# Patient Record
Sex: FEMALE | Race: BLACK OR AFRICAN AMERICAN | NOT HISPANIC OR LATINO
[De-identification: names, ages, dates, MRNs, and addresses within clinical notes are randomized per-mention and may not be internally consistent; named-entity substitution may affect disease eponyms.]

---

## 2017-01-20 ENCOUNTER — APPOINTMENT (OUTPATIENT)
Dept: PULMONOLOGY | Facility: CLINIC | Age: 62
End: 2017-01-20

## 2017-02-06 ENCOUNTER — APPOINTMENT (OUTPATIENT)
Dept: CARDIOTHORACIC SURGERY | Facility: CLINIC | Age: 62
End: 2017-02-06

## 2017-02-06 VITALS
HEIGHT: 59 IN | OXYGEN SATURATION: 97 % | WEIGHT: 138 LBS | BODY MASS INDEX: 27.82 KG/M2 | RESPIRATION RATE: 18 BRPM | HEART RATE: 82 BPM | DIASTOLIC BLOOD PRESSURE: 83 MMHG | SYSTOLIC BLOOD PRESSURE: 134 MMHG

## 2017-02-13 ENCOUNTER — RECORD ABSTRACTING (OUTPATIENT)
Age: 62
End: 2017-02-13

## 2017-02-13 DIAGNOSIS — Z86.010 PERSONAL HISTORY OF COLONIC POLYPS: ICD-10-CM

## 2017-03-21 ENCOUNTER — APPOINTMENT (OUTPATIENT)
Dept: GASTROENTEROLOGY | Facility: GI CENTER | Age: 62
End: 2017-03-21

## 2017-05-01 ENCOUNTER — APPOINTMENT (OUTPATIENT)
Dept: GASTROENTEROLOGY | Facility: GI CENTER | Age: 62
End: 2017-05-01

## 2017-06-30 ENCOUNTER — APPOINTMENT (OUTPATIENT)
Dept: PULMONOLOGY | Facility: CLINIC | Age: 62
End: 2017-06-30

## 2017-06-30 VITALS — SYSTOLIC BLOOD PRESSURE: 124 MMHG | DIASTOLIC BLOOD PRESSURE: 80 MMHG | BODY MASS INDEX: 31.51 KG/M2 | WEIGHT: 156 LBS

## 2017-06-30 VITALS — WEIGHT: 155 LBS | BODY MASS INDEX: 31.31 KG/M2

## 2017-06-30 VITALS — OXYGEN SATURATION: 96 %

## 2017-06-30 RX ORDER — WARFARIN 1 MG/1
1 TABLET ORAL
Qty: 30 | Refills: 0 | Status: ACTIVE | COMMUNITY
Start: 2017-05-27

## 2017-06-30 RX ORDER — METOPROLOL SUCCINATE 100 MG/1
100 TABLET, EXTENDED RELEASE ORAL
Qty: 180 | Refills: 0 | Status: ACTIVE | COMMUNITY
Start: 2017-03-10

## 2017-06-30 RX ORDER — WARFARIN 2 MG/1
2 TABLET ORAL
Qty: 30 | Refills: 0 | Status: ACTIVE | COMMUNITY
Start: 2017-05-27

## 2017-06-30 RX ORDER — AMOXICILLIN 500 MG/1
500 CAPSULE ORAL
Qty: 20 | Refills: 0 | Status: DISCONTINUED | COMMUNITY
Start: 2017-06-08

## 2017-06-30 RX ORDER — FLUCONAZOLE 150 MG/1
150 TABLET ORAL
Qty: 4 | Refills: 0 | Status: DISCONTINUED | COMMUNITY
Start: 2017-06-08

## 2017-06-30 RX ORDER — ENOXAPARIN SODIUM 100 MG/ML
60 INJECTION SUBCUTANEOUS
Qty: 4 | Refills: 0 | Status: DISCONTINUED | COMMUNITY
Start: 2017-04-06

## 2017-06-30 RX ORDER — CHLORHEXIDINE GLUCONATE, 0.12% ORAL RINSE 1.2 MG/ML
0.12 SOLUTION DENTAL
Qty: 473 | Refills: 0 | Status: DISCONTINUED | COMMUNITY
Start: 2017-01-20

## 2017-10-02 ENCOUNTER — APPOINTMENT (OUTPATIENT)
Dept: PULMONOLOGY | Facility: CLINIC | Age: 62
End: 2017-10-02
Payer: MEDICARE

## 2017-10-02 VITALS — BODY MASS INDEX: 33.53 KG/M2 | WEIGHT: 166 LBS

## 2017-10-02 VITALS — SYSTOLIC BLOOD PRESSURE: 128 MMHG | DIASTOLIC BLOOD PRESSURE: 80 MMHG

## 2017-10-02 PROCEDURE — 94010 BREATHING CAPACITY TEST: CPT

## 2017-10-02 PROCEDURE — 99214 OFFICE O/P EST MOD 30 MIN: CPT | Mod: 25

## 2017-10-02 RX ORDER — UMECLIDINIUM 62.5 UG/1
62.5 AEROSOL, POWDER ORAL DAILY
Qty: 1 | Refills: 5 | Status: DISCONTINUED | COMMUNITY
Start: 2017-06-30 | End: 2017-10-02

## 2018-01-08 ENCOUNTER — APPOINTMENT (OUTPATIENT)
Dept: PULMONOLOGY | Facility: CLINIC | Age: 63
End: 2018-01-08
Payer: MEDICARE

## 2018-01-08 VITALS — BODY MASS INDEX: 34.94 KG/M2 | WEIGHT: 173 LBS

## 2018-01-08 VITALS — DIASTOLIC BLOOD PRESSURE: 82 MMHG | SYSTOLIC BLOOD PRESSURE: 120 MMHG

## 2018-01-08 VITALS — OXYGEN SATURATION: 95 % | HEART RATE: 86 BPM

## 2018-01-08 PROCEDURE — 94010 BREATHING CAPACITY TEST: CPT

## 2018-01-08 PROCEDURE — 99214 OFFICE O/P EST MOD 30 MIN: CPT | Mod: 25

## 2018-05-10 ENCOUNTER — APPOINTMENT (OUTPATIENT)
Dept: PULMONOLOGY | Facility: CLINIC | Age: 63
End: 2018-05-10
Payer: MEDICARE

## 2018-05-10 VITALS
WEIGHT: 176 LBS | DIASTOLIC BLOOD PRESSURE: 78 MMHG | RESPIRATION RATE: 16 BRPM | HEART RATE: 73 BPM | OXYGEN SATURATION: 95 % | SYSTOLIC BLOOD PRESSURE: 126 MMHG | BODY MASS INDEX: 35.55 KG/M2

## 2018-05-10 DIAGNOSIS — R57.0 CARDIOGENIC SHOCK: ICD-10-CM

## 2018-05-10 PROCEDURE — 99214 OFFICE O/P EST MOD 30 MIN: CPT

## 2018-11-12 ENCOUNTER — APPOINTMENT (OUTPATIENT)
Dept: PULMONOLOGY | Facility: CLINIC | Age: 63
End: 2018-11-12
Payer: MEDICARE

## 2018-11-12 VITALS
HEART RATE: 81 BPM | OXYGEN SATURATION: 98 % | SYSTOLIC BLOOD PRESSURE: 130 MMHG | WEIGHT: 179 LBS | DIASTOLIC BLOOD PRESSURE: 80 MMHG | BODY MASS INDEX: 36.15 KG/M2

## 2018-11-12 PROCEDURE — 99214 OFFICE O/P EST MOD 30 MIN: CPT

## 2018-11-12 RX ORDER — POTASSIUM CHLORIDE 1500 MG/1
20 TABLET, EXTENDED RELEASE ORAL
Qty: 30 | Refills: 0 | Status: DISCONTINUED | COMMUNITY
Start: 2016-12-30 | End: 2018-11-12

## 2019-05-13 ENCOUNTER — APPOINTMENT (OUTPATIENT)
Dept: PULMONOLOGY | Facility: CLINIC | Age: 64
End: 2019-05-13

## 2019-05-15 ENCOUNTER — EMERGENCY (EMERGENCY)
Facility: HOSPITAL | Age: 64
LOS: 1 days | Discharge: HOME | End: 2019-05-15
Attending: STUDENT IN AN ORGANIZED HEALTH CARE EDUCATION/TRAINING PROGRAM
Payer: MEDICARE

## 2019-05-15 VITALS
RESPIRATION RATE: 18 BRPM | HEIGHT: 60 IN | SYSTOLIC BLOOD PRESSURE: 142 MMHG | WEIGHT: 179.9 LBS | OXYGEN SATURATION: 94 % | TEMPERATURE: 98 F | DIASTOLIC BLOOD PRESSURE: 76 MMHG | HEART RATE: 92 BPM

## 2019-05-15 DIAGNOSIS — Z95.2 PRESENCE OF PROSTHETIC HEART VALVE: Chronic | ICD-10-CM

## 2019-05-15 DIAGNOSIS — Z78.9 OTHER SPECIFIED HEALTH STATUS: Chronic | ICD-10-CM

## 2019-05-15 LAB
ALBUMIN SERPL ELPH-MCNC: 4.4 G/DL — SIGNIFICANT CHANGE UP (ref 3.3–5.2)
ALP SERPL-CCNC: 87 U/L — SIGNIFICANT CHANGE UP (ref 40–120)
ALT FLD-CCNC: 18 U/L — SIGNIFICANT CHANGE UP
ANION GAP SERPL CALC-SCNC: 15 MMOL/L — SIGNIFICANT CHANGE UP (ref 5–17)
AST SERPL-CCNC: 21 U/L — SIGNIFICANT CHANGE UP
BASOPHILS # BLD AUTO: 0 K/UL — SIGNIFICANT CHANGE UP (ref 0–0.2)
BASOPHILS NFR BLD AUTO: 0.3 % — SIGNIFICANT CHANGE UP (ref 0–2)
BILIRUB SERPL-MCNC: 0.3 MG/DL — LOW (ref 0.4–2)
BUN SERPL-MCNC: 16 MG/DL — SIGNIFICANT CHANGE UP (ref 8–20)
CALCIUM SERPL-MCNC: 10.1 MG/DL — SIGNIFICANT CHANGE UP (ref 8.6–10.2)
CHLORIDE SERPL-SCNC: 100 MMOL/L — SIGNIFICANT CHANGE UP (ref 98–107)
CK SERPL-CCNC: 112 U/L — SIGNIFICANT CHANGE UP (ref 25–170)
CO2 SERPL-SCNC: 22 MMOL/L — SIGNIFICANT CHANGE UP (ref 22–29)
CREAT SERPL-MCNC: 0.77 MG/DL — SIGNIFICANT CHANGE UP (ref 0.5–1.3)
EOSINOPHIL # BLD AUTO: 0.1 K/UL — SIGNIFICANT CHANGE UP (ref 0–0.5)
EOSINOPHIL NFR BLD AUTO: 2.2 % — SIGNIFICANT CHANGE UP (ref 0–6)
GLUCOSE SERPL-MCNC: 78 MG/DL — SIGNIFICANT CHANGE UP (ref 70–115)
HCT VFR BLD CALC: 45 % — SIGNIFICANT CHANGE UP (ref 37–47)
HGB BLD-MCNC: 14.8 G/DL — SIGNIFICANT CHANGE UP (ref 12–16)
LYMPHOCYTES # BLD AUTO: 1.5 K/UL — SIGNIFICANT CHANGE UP (ref 1–4.8)
LYMPHOCYTES # BLD AUTO: 25.6 % — SIGNIFICANT CHANGE UP (ref 20–55)
MAGNESIUM SERPL-MCNC: 2.1 MG/DL — SIGNIFICANT CHANGE UP (ref 1.8–2.6)
MCHC RBC-ENTMCNC: 28.8 PG — SIGNIFICANT CHANGE UP (ref 27–31)
MCHC RBC-ENTMCNC: 32.9 G/DL — SIGNIFICANT CHANGE UP (ref 32–36)
MCV RBC AUTO: 87.5 FL — SIGNIFICANT CHANGE UP (ref 81–99)
MONOCYTES # BLD AUTO: 0.8 K/UL — SIGNIFICANT CHANGE UP (ref 0–0.8)
MONOCYTES NFR BLD AUTO: 14.5 % — HIGH (ref 3–10)
NEUTROPHILS # BLD AUTO: 3.3 K/UL — SIGNIFICANT CHANGE UP (ref 1.8–8)
NEUTROPHILS NFR BLD AUTO: 57.2 % — SIGNIFICANT CHANGE UP (ref 37–73)
PLATELET # BLD AUTO: 234 K/UL — SIGNIFICANT CHANGE UP (ref 150–400)
POTASSIUM SERPL-MCNC: 4.4 MMOL/L — SIGNIFICANT CHANGE UP (ref 3.5–5.3)
POTASSIUM SERPL-SCNC: 4.4 MMOL/L — SIGNIFICANT CHANGE UP (ref 3.5–5.3)
PROT SERPL-MCNC: 7.5 G/DL — SIGNIFICANT CHANGE UP (ref 6.6–8.7)
RBC # BLD: 5.14 M/UL — SIGNIFICANT CHANGE UP (ref 4.4–5.2)
RBC # FLD: 13.9 % — SIGNIFICANT CHANGE UP (ref 11–15.6)
SODIUM SERPL-SCNC: 137 MMOL/L — SIGNIFICANT CHANGE UP (ref 135–145)
WBC # BLD: 5.8 K/UL — SIGNIFICANT CHANGE UP (ref 4.8–10.8)
WBC # FLD AUTO: 5.8 K/UL — SIGNIFICANT CHANGE UP (ref 4.8–10.8)

## 2019-05-15 PROCEDURE — 82550 ASSAY OF CK (CPK): CPT

## 2019-05-15 PROCEDURE — 99284 EMERGENCY DEPT VISIT MOD MDM: CPT

## 2019-05-15 PROCEDURE — 80053 COMPREHEN METABOLIC PANEL: CPT

## 2019-05-15 PROCEDURE — 36415 COLL VENOUS BLD VENIPUNCTURE: CPT

## 2019-05-15 PROCEDURE — 83735 ASSAY OF MAGNESIUM: CPT

## 2019-05-15 PROCEDURE — 93970 EXTREMITY STUDY: CPT

## 2019-05-15 PROCEDURE — 85027 COMPLETE CBC AUTOMATED: CPT

## 2019-05-15 PROCEDURE — 93970 EXTREMITY STUDY: CPT | Mod: 26

## 2019-05-15 NOTE — ED STATDOCS - NS ED ROS FT
Const:No fever/chills  EENMT: No photophobia/eye pain/changes in vision, No ear pain/sore throat/dysphagia,   Cardiac: No chest pain/palpitations  Resp: no SOB/cough/wheeze/stridor  Abd: No abdominal pain, No N/V/D  : no dysuria/frequency/discharge  MSK: No neck/back pain, +BLE pain  Skin: no rash  Neuro: no changes in neurological status/function.

## 2019-05-15 NOTE — ED STATDOCS - PROGRESS NOTE DETAILS
PA Note: PT evaluated by intake physician. HPI/PE/ROS as noted above. Will follow up plan per intake physician. PA Note: labwork and ultrasound wnl. Pt PA Note: labwork and ultrasound wnl. Pt given copies of her results and instructed to follow up with her PCP. Pt acknowledged understanding.

## 2019-05-15 NOTE — ED ADULT NURSE NOTE - OBJECTIVE STATEMENT
assumed pt care at 1957.  Pt reports muscle cramps in both legs and feet today.  Took motrin at 1600. Currently has no pain.

## 2019-05-15 NOTE — ED STATDOCS - PHYSICAL EXAMINATION
Constitutional - well-developed; well nourished.   Head - NCAT. Airway patent.   Eyes - PERRL.   CV - RRR. no murmur. no edema.   Pulm - CTAB.   Abd - soft, nt. no rebound. no guarding.   Neuro - A&Ox3. strength 5/5 x4. sensation intact x4. normal gait.   Skin - No rash.   MSK - normal ROM.

## 2019-05-15 NOTE — ED STATDOCS - ATTENDING CONTRIBUTION TO CARE
I performed a face to face history and physical exam of the patient and discussed their management with the resident/ACP. I reviewed the resident/ACP's note and agree with the documented findings and plan of care.    Pt with b/L lower extremity cramping. labs and imaging reviewed. Pt reassured and instructed to f/up with pcp and to return for new/worsening symptoms.

## 2019-05-15 NOTE — ED STATDOCS - OBJECTIVE STATEMENT
Pt is a 65 y/o F presenting to the ED with c/o bilateral leg and feet pain, onset today. Pt states she woke up with a cramping in her feet that radiates up her legs. She states the pain has been constant since and worse with walking. Denies fever, swelling, numbness/tingling/weakness ot the BLE, CP, and SOB. no trauma. Pt states she walked around all day but it did not exacerbate or relieve her sxs. Took motrin prior to arrival with some improvement in sxs.

## 2019-05-15 NOTE — ED ADULT NURSE NOTE - CHIEF COMPLAINT QUOTE
Patient arrived ambulatory to ED, awake, alert, and oriented times 3, breathing unlabored.  Patient complaining of " cramping and spasms" to bilateral lower extremities.  No SOB.  Symptoms states symptoms started this morning.  Normal O2 sat between 92-94% as per patient.

## 2020-02-11 PROBLEM — Z78.9 OTHER SPECIFIED HEALTH STATUS: Chronic | Status: ACTIVE | Noted: 2019-05-15

## 2020-03-18 ENCOUNTER — APPOINTMENT (OUTPATIENT)
Dept: PULMONOLOGY | Facility: CLINIC | Age: 65
End: 2020-03-18
Payer: MEDICARE

## 2020-03-18 VITALS
BODY MASS INDEX: 36.08 KG/M2 | OXYGEN SATURATION: 96 % | DIASTOLIC BLOOD PRESSURE: 86 MMHG | WEIGHT: 179 LBS | HEIGHT: 59 IN | HEART RATE: 96 BPM | SYSTOLIC BLOOD PRESSURE: 120 MMHG

## 2020-03-18 PROCEDURE — 99214 OFFICE O/P EST MOD 30 MIN: CPT

## 2020-03-18 NOTE — PHYSICAL EXAM
[No Acute Distress] : no acute distress [Normal Oropharynx] : normal oropharynx [Normal Appearance] : normal appearance [No Neck Mass] : no neck mass [Normal Rate/Rhythm] : normal rate/rhythm [Normal S1, S2] : normal s1, s2 [No Murmurs] : no murmurs [No Resp Distress] : no resp distress [Clear to Auscultation Bilaterally] : clear to auscultation bilaterally [No Abnormalities] : no abnormalities [Benign] : benign [Normal Gait] : normal gait [No Clubbing] : no clubbing [No Cyanosis] : no cyanosis [No Edema] : no edema [FROM] : FROM [Normal Color/ Pigmentation] : normal color/ pigmentation [No Focal Deficits] : no focal deficits [Oriented x3] : oriented x3 [Normal Affect] : normal affect [TextBox_54] : Mechanical valve snap with systole

## 2020-03-18 NOTE — REVIEW OF SYSTEMS
[Negative] : Endocrine [Chest Discomfort] : no chest discomfort [Edema] : no edema [Orthopnea] : no orthopnea [PND] : no PND [TextBox_30] : Per HPI

## 2020-03-25 RX ORDER — TIOTROPIUM BROMIDE INHALATION SPRAY 3.12 UG/1
2.5 SPRAY, METERED RESPIRATORY (INHALATION) DAILY
Qty: 3 | Refills: 1 | Status: DISCONTINUED | COMMUNITY
Start: 2017-10-02 | End: 2020-03-25

## 2021-01-21 ENCOUNTER — EMERGENCY (EMERGENCY)
Facility: HOSPITAL | Age: 66
LOS: 1 days | Discharge: HOME | End: 2021-01-21
Attending: EMERGENCY MEDICINE
Payer: MEDICARE

## 2021-01-21 VITALS
WEIGHT: 184.09 LBS | OXYGEN SATURATION: 100 % | HEIGHT: 60 IN | HEART RATE: 77 BPM | SYSTOLIC BLOOD PRESSURE: 155 MMHG | RESPIRATION RATE: 19 BRPM | DIASTOLIC BLOOD PRESSURE: 81 MMHG | TEMPERATURE: 99 F

## 2021-01-21 DIAGNOSIS — Z95.2 PRESENCE OF PROSTHETIC HEART VALVE: Chronic | ICD-10-CM

## 2021-01-21 DIAGNOSIS — Z78.9 OTHER SPECIFIED HEALTH STATUS: Chronic | ICD-10-CM

## 2021-01-21 LAB
ALBUMIN SERPL ELPH-MCNC: 4.2 G/DL — SIGNIFICANT CHANGE UP (ref 3.3–5.2)
ALP SERPL-CCNC: 89 U/L — SIGNIFICANT CHANGE UP (ref 40–120)
ALT FLD-CCNC: 59 U/L — HIGH
ANION GAP SERPL CALC-SCNC: 10 MMOL/L — SIGNIFICANT CHANGE UP (ref 5–17)
APTT BLD: 41.1 SEC — HIGH (ref 27.5–35.5)
AST SERPL-CCNC: 94 U/L — HIGH
BASOPHILS # BLD AUTO: 0 K/UL — SIGNIFICANT CHANGE UP (ref 0–0.2)
BASOPHILS NFR BLD AUTO: 0 % — SIGNIFICANT CHANGE UP (ref 0–2)
BILIRUB SERPL-MCNC: 0.7 MG/DL — SIGNIFICANT CHANGE UP (ref 0.4–2)
BUN SERPL-MCNC: 16 MG/DL — SIGNIFICANT CHANGE UP (ref 8–20)
CALCIUM SERPL-MCNC: 9.8 MG/DL — SIGNIFICANT CHANGE UP (ref 8.6–10.2)
CHLORIDE SERPL-SCNC: 100 MMOL/L — SIGNIFICANT CHANGE UP (ref 98–107)
CO2 SERPL-SCNC: 25 MMOL/L — SIGNIFICANT CHANGE UP (ref 22–29)
CREAT SERPL-MCNC: 1.21 MG/DL — SIGNIFICANT CHANGE UP (ref 0.5–1.3)
EOSINOPHIL # BLD AUTO: 0 K/UL — SIGNIFICANT CHANGE UP (ref 0–0.5)
EOSINOPHIL NFR BLD AUTO: 0 % — SIGNIFICANT CHANGE UP (ref 0–6)
GIANT PLATELETS BLD QL SMEAR: PRESENT — SIGNIFICANT CHANGE UP
GLUCOSE SERPL-MCNC: 102 MG/DL — HIGH (ref 70–99)
HCT VFR BLD CALC: 45.4 % — HIGH (ref 34.5–45)
HGB BLD-MCNC: 14.8 G/DL — SIGNIFICANT CHANGE UP (ref 11.5–15.5)
INR BLD: 3.45 RATIO — HIGH (ref 0.88–1.16)
LIDOCAIN IGE QN: 20 U/L — LOW (ref 22–51)
LYMPHOCYTES # BLD AUTO: 1.89 K/UL — SIGNIFICANT CHANGE UP (ref 1–3.3)
LYMPHOCYTES # BLD AUTO: 14.8 % — SIGNIFICANT CHANGE UP (ref 13–44)
MANUAL SMEAR VERIFICATION: SIGNIFICANT CHANGE UP
MCHC RBC-ENTMCNC: 29.3 PG — SIGNIFICANT CHANGE UP (ref 27–34)
MCHC RBC-ENTMCNC: 32.6 GM/DL — SIGNIFICANT CHANGE UP (ref 32–36)
MCV RBC AUTO: 89.9 FL — SIGNIFICANT CHANGE UP (ref 80–100)
MONOCYTES # BLD AUTO: 1.56 K/UL — HIGH (ref 0–0.9)
MONOCYTES NFR BLD AUTO: 12.2 % — SIGNIFICANT CHANGE UP (ref 2–14)
MYELOCYTES NFR BLD: 0.9 % — HIGH (ref 0–0)
NEUTROPHILS # BLD AUTO: 9.2 K/UL — HIGH (ref 1.8–7.4)
NEUTROPHILS NFR BLD AUTO: 71.3 % — SIGNIFICANT CHANGE UP (ref 43–77)
NEUTS BAND # BLD: 0.8 % — SIGNIFICANT CHANGE UP (ref 0–8)
OVALOCYTES BLD QL SMEAR: SLIGHT — SIGNIFICANT CHANGE UP
PLAT MORPH BLD: NORMAL — SIGNIFICANT CHANGE UP
PLATELET # BLD AUTO: 224 K/UL — SIGNIFICANT CHANGE UP (ref 150–400)
POIKILOCYTOSIS BLD QL AUTO: SLIGHT — SIGNIFICANT CHANGE UP
POLYCHROMASIA BLD QL SMEAR: SLIGHT — SIGNIFICANT CHANGE UP
POTASSIUM SERPL-MCNC: 4.4 MMOL/L — SIGNIFICANT CHANGE UP (ref 3.5–5.3)
POTASSIUM SERPL-SCNC: 4.4 MMOL/L — SIGNIFICANT CHANGE UP (ref 3.5–5.3)
PROT SERPL-MCNC: 8 G/DL — SIGNIFICANT CHANGE UP (ref 6.6–8.7)
PROTHROM AB SERPL-ACNC: 37.7 SEC — HIGH (ref 10.6–13.6)
RBC # BLD: 5.05 M/UL — SIGNIFICANT CHANGE UP (ref 3.8–5.2)
RBC # FLD: 13.7 % — SIGNIFICANT CHANGE UP (ref 10.3–14.5)
RBC BLD AUTO: ABNORMAL
SODIUM SERPL-SCNC: 135 MMOL/L — SIGNIFICANT CHANGE UP (ref 135–145)
WBC # BLD: 12.76 K/UL — HIGH (ref 3.8–10.5)
WBC # FLD AUTO: 12.76 K/UL — HIGH (ref 3.8–10.5)

## 2021-01-21 PROCEDURE — 99218: CPT

## 2021-01-21 RX ORDER — METOPROLOL TARTRATE 50 MG
50 TABLET ORAL
Refills: 0 | Status: DISCONTINUED | OUTPATIENT
Start: 2021-01-21 | End: 2021-01-26

## 2021-01-21 RX ORDER — WARFARIN SODIUM 2.5 MG/1
2 TABLET ORAL ONCE
Refills: 0 | Status: COMPLETED | OUTPATIENT
Start: 2021-01-21 | End: 2021-01-21

## 2021-01-21 RX ORDER — OXYCODONE AND ACETAMINOPHEN 5; 325 MG/1; MG/1
1 TABLET ORAL ONCE
Refills: 0 | Status: DISCONTINUED | OUTPATIENT
Start: 2021-01-21 | End: 2021-01-21

## 2021-01-21 RX ORDER — OXYCODONE AND ACETAMINOPHEN 5; 325 MG/1; MG/1
1 TABLET ORAL EVERY 6 HOURS
Refills: 0 | Status: DISCONTINUED | OUTPATIENT
Start: 2021-01-21 | End: 2021-01-21

## 2021-01-21 RX ADMIN — OXYCODONE AND ACETAMINOPHEN 1 TABLET(S): 5; 325 TABLET ORAL at 19:22

## 2021-01-21 RX ADMIN — OXYCODONE AND ACETAMINOPHEN 1 TABLET(S): 5; 325 TABLET ORAL at 23:35

## 2021-01-21 RX ADMIN — Medication 50 MILLIGRAM(S): at 23:34

## 2021-01-21 NOTE — CONSULT NOTE ADULT - SUBJECTIVE AND OBJECTIVE BOX
Vascular Surgery Consult Note    HPI:  Ms. Mayfield is a 65F, PMH of CHF, COPD, Afib, Mitral Valve Replacement; therapeutic on Coumadin, presenting with 4-weeks of worsening right flank pain. Patient denies trauma to the area or similar pain in the past. Pain is associated with abdominal discomfort and low appetite. Denies associated weight loss, extremity swelling, CP, or SOB. Patient evaluated by PCP and found to have microscopic hematuria. A CT A/P was obtained at that time demonstrating a filling defect in the right renal artery consistent with thromboembolic disease. Vascular surgery consulted for evaluation.         Allergies    No Known Allergies    Intolerances    	    MEDICATIONS:    PAST MEDICAL & SURGICAL HISTORY:  No pertinent past medical history    Mitral valve replaced    CHF (congestive heart failure)    COPD exacerbation    Poor historian    H/O mitral valve replacement with mechanical valve    Mitral valve replaced      FAMILY HISTORY:      SOCIAL HISTORY:  unchanged    REVIEW OF SYSTEMS:	  [ x] All others negative except as per HPI	      PHYSICAL EXAM:  T(C): 36.9 (01-21-21 @ 22:38), Max: 37.3 (01-21-21 @ 17:52)  HR: 77 (01-21-21 @ 22:38) (77 - 77)  BP: 140/70 (01-21-21 @ 22:38) (140/70 - 155/81)  RR: 18 (01-21-21 @ 22:38) (18 - 19)  SpO2: 94% (01-21-21 @ 22:38) (94% - 100%)  Wt(kg): --  I&O's Summary      Appearance: NAD  HEENT:   Normal oral mucosa, PERRL, EOMI	  Lymphatic: No lymphadenopathy  Cardiovascular: Normal S1 S2, No JVD, No murmurs  Respiratory: Lungs clear to auscultation	  Psychiatry: A & O x 3, Mood & affect appropriate  Gastrointestinal:  Soft, Non-tender, + BS	  Back: No flank tenderness   Skin: No rashes, No ecchymoses, No cyanosis	  Neurologic: Non-focal  Extremities: Normal range of motion.    Vascular Pulse Exam:  Right Femoral:  Palpable       Left Femoral:  Palpable  Right Popliteal:  Palpable      Left Popliteal:  Palpable  Right DP:  Palpable                 Left DP:  Palpable  Right PT:  Palpable                 Left DP:  Palpable    Foot Exam:  Warm, no ulceration.        LABS:	 	    CBC Full  -  ( 21 Jan 2021 19:41 )  WBC Count : 12.76 K/uL  Hemoglobin : 14.8 g/dL  Hematocrit : 45.4 %  Platelet Count - Automated : 224 K/uL  Mean Cell Volume : 89.9 fl  Mean Cell Hemoglobin : 29.3 pg  Mean Cell Hemoglobin Concentration : 32.6 gm/dL  Auto Neutrophil # : 9.20 K/uL  Auto Lymphocyte # : 1.89 K/uL  Auto Monocyte # : 1.56 K/uL  Auto Eosinophil # : 0.00 K/uL  Auto Basophil # : 0.00 K/uL  Auto Neutrophil % : 71.3 %  Auto Lymphocyte % : 14.8 %  Auto Monocyte % : 12.2 %  Auto Eosinophil % : 0.0 %  Auto Basophil % : 0.0 %    01-21    135  |  100  |  16.0  ----------------------------<  102<H>  4.4   |  25.0  |  1.21    Ca    9.8      21 Jan 2021 19:41    TPro  8.0  /  Alb  4.2  /  TBili  0.7  /  DBili  x   /  AST  94<H>  /  ALT  59<H>  /  AlkPhos  89  01-21          Assessment:  1.            Plan:  1.          Thank you      Vascular Cardiology Service    Please call with any questions:   [facility] - [id number]  Office [phone]  email:  [email or web address]     Vascular Surgery Consult Note    HPI:  Ms. Mayfield is a 65F, PMH of CHF, COPD, Afib, Mitral Valve Replacement; therapeutic on Coumadin, presenting with 4-weeks of worsening right flank pain. Patient denies trauma to the area or similar pain in the past. Pain is associated with abdominal discomfort and low appetite. Denies associated weight loss, extremity swelling, CP, or SOB. Patient evaluated by PCP and found to have microscopic hematuria. A CT A/P was obtained at that time demonstrating a filling defect in the right renal artery consistent with thromboembolic disease. Vascular surgery consulted for evaluation.         Allergies    No Known Allergies    Intolerances    	    MEDICATIONS:    PAST MEDICAL & SURGICAL HISTORY:  No pertinent past medical history    Mitral valve replaced    CHF (congestive heart failure)    COPD exacerbation    Poor historian    H/O mitral valve replacement with mechanical valve    Mitral valve replaced      FAMILY HISTORY:      SOCIAL HISTORY:  unchanged    REVIEW OF SYSTEMS:	  [ x] All others negative except as per HPI	      PHYSICAL EXAM:  T(C): 36.9 (01-21-21 @ 22:38), Max: 37.3 (01-21-21 @ 17:52)  HR: 77 (01-21-21 @ 22:38) (77 - 77)  BP: 140/70 (01-21-21 @ 22:38) (140/70 - 155/81)  RR: 18 (01-21-21 @ 22:38) (18 - 19)  SpO2: 94% (01-21-21 @ 22:38) (94% - 100%)  Wt(kg): --  I&O's Summary      Appearance: NAD  HEENT:   Normal oral mucosa, PERRL, EOMI	  Lymphatic: No lymphadenopathy  Cardiovascular: Normal S1 S2, No JVD, No murmurs  Respiratory: Lungs clear to auscultation	  Psychiatry: A & O x 3, Mood & affect appropriate  Gastrointestinal:  Soft, Non-tender, + BS	  Back: No flank tenderness   Skin: No rashes, No ecchymoses, No cyanosis	  Neurologic: Non-focal  Extremities: Normal range of motion.    Vascular Pulse Exam:  Right Femoral:  Palpable       Left Femoral:  Palpable  Right Popliteal:  Palpable      Left Popliteal:  Palpable  Right DP:  Palpable                 Left DP:  Palpable  Right PT:  Palpable                 Left PT:  Palpable        LABS:	 	    CBC Full  -  ( 21 Jan 2021 19:41 )  WBC Count : 12.76 K/uL  Hemoglobin : 14.8 g/dL  Hematocrit : 45.4 %  Platelet Count - Automated : 224 K/uL  Mean Cell Volume : 89.9 fl  Mean Cell Hemoglobin : 29.3 pg  Mean Cell Hemoglobin Concentration : 32.6 gm/dL  Auto Neutrophil # : 9.20 K/uL  Auto Lymphocyte # : 1.89 K/uL  Auto Monocyte # : 1.56 K/uL  Auto Eosinophil # : 0.00 K/uL  Auto Basophil # : 0.00 K/uL  Auto Neutrophil % : 71.3 %  Auto Lymphocyte % : 14.8 %  Auto Monocyte % : 12.2 %  Auto Eosinophil % : 0.0 %  Auto Basophil % : 0.0 %    01-21    135  |  100  |  16.0  ----------------------------<  102<H>  4.4   |  25.0  |  1.21    Ca    9.8      21 Jan 2021 19:41    TPro  8.0  /  Alb  4.2  /  TBili  0.7  /  DBili  x   /  AST  94<H>  /  ALT  59<H>  /  AlkPhos  89  01-21

## 2021-01-21 NOTE — ED CDU PROVIDER INITIAL DAY NOTE - OBJECTIVE STATEMENT
64 yo female hx of copd chf  afib on coumadin MVR followed by [facility] presenting with outpatient testing positive for renal clot. no hx of blood clots. notes that she is compliant on all medications, notes that she has been having lower back pain for about a month, no syuria or changes in frequency of urination.  INR 3.45, vascular requesting further eval with TTE. denies chest pain shortness of breath, leg swelling or pain.   non smoker   + family hx of cad no family hx kidney pathology or blood clots

## 2021-01-21 NOTE — ED STATDOCS - PMH
CHF (congestive heart failure)    COPD exacerbation    Mitral valve replaced    No pertinent past medical history    Poor historian

## 2021-01-21 NOTE — CONSULT NOTE ADULT - ASSESSMENT
65F, PMH of CHF, COPD, Afib, mechanical mitral valve replacement; therapeutic on Coumadin, presenting with 4-weeks of worsening right flank pain. CT A/P obtained at outside facility demonstrating right renal infarct secondary to thromboembolic disease. Creatinine elevated above baseline. AVSS.    -No acute vascular surgery intervention indicated at this time   -Recommend continue Coumadin, maintain INR 2-3  -Recommend obtain TTE, evaluate for source of emboli  -Consider Cardiology consult, patient follows with [facility]  65F, PMH of CHF, COPD, Afib, mechanical mitral valve replacement; therapeutic on Coumadin, presenting with 4-weeks of worsening right flank pain. CT A/P obtained at outside facility demonstrating right renal infarct secondary to thromboembolic disease. Creatinine elevated above baseline. AVSS.    -No acute vascular surgery intervention indicated at this time   -Recommend continue Coumadin, maintain INR 2-3  -Recommend obtain TTE, evaluate for source of emboli  -Consider Cardiology consult, patient follows with [facility]   -Vascular surgery will sign off at this time, please reconsult PRN

## 2021-01-21 NOTE — ED STATDOCS - OBJECTIVE STATEMENT
66y/o F with PMHx of CHF, COPD, Afib, Mitral Valve Replacement; On Warfarin presents to the ED c/o flank pain which began 1 month ago radiating to abdomen. Assoc. sx of nausea. Pt states that pain improved after eating. She has been taking Tylenol and Motrin without relief. Pt presented to PCP, had outpatient CT and was instructed to ED after test showed blood clot on kidneys. Denies fever or SOB.   PCP: Marcelo; Cardiologist: Lucila

## 2021-01-21 NOTE — ED CDU PROVIDER INITIAL DAY NOTE - NS ED ROS FT
+ lower back pain   - chest pain -sob -woo   - abdominal pain  - dysuria   + lower back pain   - accident or injury   - bladder or bowel incontinence   - numbness tingling   - fever chills

## 2021-01-21 NOTE — ED ADULT NURSE NOTE - OBJECTIVE STATEMENT
Pt in no apparent distress at this time. Airway patent, breathing spontaneous and nonlabored. Pt A&Ox3 resting in stretcher. Pt c/o flank pain for a few eeks. testing today showed kidney hematoma, PMD called and told her to come to ED. pt in on coumadin x 5 years for mechanical hearth valve. pt is mildly SOB which is baseline, hx of smoker.

## 2021-01-21 NOTE — ED CDU PROVIDER INITIAL DAY NOTE - MEDICAL DECISION MAKING DETAILS
66 yo female placed in observation for telemetry monitoring TTE and [place] cards consult due to new finding of renal artery clot. vascular cleared patient but requesting further eval

## 2021-01-21 NOTE — ED CDU PROVIDER INITIAL DAY NOTE - ATTENDING CONTRIBUTION TO CARE
Pt. with renal infarct on CT scan. Pt. admitted to observation unit. Pt. stable appearing. NO significant complaint. I, Dr. Marsh, performed a face to face bedside interview with this patient regarding history of present illness, review of symptoms and relevant past medical, social and family history.  I completed an independent physical examination.  I have also reviewed the ACP's note(s) and discussed the plan with the ACP.

## 2021-01-21 NOTE — ED ADULT TRIAGE NOTE - CHIEF COMPLAINT QUOTE
pt reports shes been having right sided back pain and abd pain x4 weeks, reports she went for CT scan and was told she has a blood clot in her kidney

## 2021-01-21 NOTE — ED STATDOCS - CLINICAL SUMMARY MEDICAL DECISION MAKING FREE TEXT BOX
Pt with back pain x1 month, diagnosed today with blood clot to her kidnies, will check lab, attempt to obtain official CT report, treat pain and reassess.

## 2021-01-21 NOTE — ED STATDOCS - PROGRESS NOTE DETAILS
CT report scanned into the computer. Vascular Team consulted and pt. will be evaluated. Pt. stable appearing. Pt. to be admitted to observation unit for Echo and cardiology consult.

## 2021-01-22 VITALS
TEMPERATURE: 99 F | RESPIRATION RATE: 20 BRPM | DIASTOLIC BLOOD PRESSURE: 75 MMHG | HEART RATE: 74 BPM | OXYGEN SATURATION: 95 % | SYSTOLIC BLOOD PRESSURE: 114 MMHG

## 2021-01-22 LAB
APPEARANCE UR: CLEAR — SIGNIFICANT CHANGE UP
BACTERIA # UR AUTO: NEGATIVE — SIGNIFICANT CHANGE UP
BILIRUB UR-MCNC: NEGATIVE — SIGNIFICANT CHANGE UP
CHOLEST SERPL-MCNC: 124 MG/DL — SIGNIFICANT CHANGE UP
COLOR SPEC: ABNORMAL
DIFF PNL FLD: ABNORMAL
EPI CELLS # UR: SIGNIFICANT CHANGE UP
GLUCOSE UR QL: NEGATIVE MG/DL — SIGNIFICANT CHANGE UP
HDLC SERPL-MCNC: 41 MG/DL — LOW
KETONES UR-MCNC: ABNORMAL
LEUKOCYTE ESTERASE UR-ACNC: NEGATIVE — SIGNIFICANT CHANGE UP
LIPID PNL WITH DIRECT LDL SERPL: 67 MG/DL — SIGNIFICANT CHANGE UP
NITRITE UR-MCNC: NEGATIVE — SIGNIFICANT CHANGE UP
NON HDL CHOLESTEROL: 83 MG/DL — SIGNIFICANT CHANGE UP
PH UR: 5 — SIGNIFICANT CHANGE UP (ref 5–8)
PROT UR-MCNC: 100 MG/DL
RBC CASTS # UR COMP ASSIST: ABNORMAL /HPF (ref 0–4)
SARS-COV-2 RNA SPEC QL NAA+PROBE: SIGNIFICANT CHANGE UP
SP GR SPEC: 1.02 — SIGNIFICANT CHANGE UP (ref 1.01–1.02)
TRIGL SERPL-MCNC: 81 MG/DL — SIGNIFICANT CHANGE UP
TROPONIN T SERPL-MCNC: <0.01 NG/ML — SIGNIFICANT CHANGE UP (ref 0–0.06)
UROBILINOGEN FLD QL: NEGATIVE MG/DL — SIGNIFICANT CHANGE UP
WBC UR QL: SIGNIFICANT CHANGE UP

## 2021-01-22 PROCEDURE — 85025 COMPLETE CBC W/AUTO DIFF WBC: CPT

## 2021-01-22 PROCEDURE — 85610 PROTHROMBIN TIME: CPT

## 2021-01-22 PROCEDURE — U0005: CPT

## 2021-01-22 PROCEDURE — 93005 ELECTROCARDIOGRAM TRACING: CPT

## 2021-01-22 PROCEDURE — 71045 X-RAY EXAM CHEST 1 VIEW: CPT | Mod: 26

## 2021-01-22 PROCEDURE — 99217: CPT

## 2021-01-22 PROCEDURE — C8929: CPT

## 2021-01-22 PROCEDURE — 80053 COMPREHEN METABOLIC PANEL: CPT

## 2021-01-22 PROCEDURE — 81001 URINALYSIS AUTO W/SCOPE: CPT

## 2021-01-22 PROCEDURE — 83690 ASSAY OF LIPASE: CPT

## 2021-01-22 PROCEDURE — 71045 X-RAY EXAM CHEST 1 VIEW: CPT

## 2021-01-22 PROCEDURE — 80061 LIPID PANEL: CPT

## 2021-01-22 PROCEDURE — 84484 ASSAY OF TROPONIN QUANT: CPT

## 2021-01-22 PROCEDURE — 99285 EMERGENCY DEPT VISIT HI MDM: CPT

## 2021-01-22 PROCEDURE — 36415 COLL VENOUS BLD VENIPUNCTURE: CPT

## 2021-01-22 PROCEDURE — 99284 EMERGENCY DEPT VISIT MOD MDM: CPT | Mod: 25

## 2021-01-22 PROCEDURE — 85730 THROMBOPLASTIN TIME PARTIAL: CPT

## 2021-01-22 PROCEDURE — G0378: CPT

## 2021-01-22 PROCEDURE — U0003: CPT

## 2021-01-22 PROCEDURE — 93010 ELECTROCARDIOGRAM REPORT: CPT | Mod: 76

## 2021-01-22 RX ORDER — ASPIRIN/CALCIUM CARB/MAGNESIUM 324 MG
81 TABLET ORAL DAILY
Refills: 0 | Status: DISCONTINUED | OUTPATIENT
Start: 2021-01-22 | End: 2021-01-26

## 2021-01-22 RX ADMIN — WARFARIN SODIUM 2 MILLIGRAM(S): 2.5 TABLET ORAL at 00:08

## 2021-01-22 RX ADMIN — Medication 50 MILLIGRAM(S): at 06:45

## 2021-01-22 NOTE — ED CDU PROVIDER DISPOSITION NOTE - NSFOLLOWUPCLINICS_GEN_ALL_ED_FT
[facility]  Hematology/Oncology  [address] [place] [address]  Phone: [phone]  Fax:   Follow Up Time:

## 2021-01-22 NOTE — CONSULT NOTE ADULT - SUBJECTIVE AND OBJECTIVE BOX
[facility], [facility]                                                    [address], [address], [place] [address]                                                         PHONE: [phone]    FAX: [phone] [address] [facility], [address], [place] [address]                                                 PHONE: [phone]    FAX: [phone]  *******************************************************************************  cc: renal artery thromboembolic disease    HPI: 65F with microscopic hematuria and right flank pain x 1 month with right renal artery thromboembolic event. Hx of CHF, COPD, Afib, Mitral Valve Replacement. Pt on coumadin therapy. INR on presentation 3.43.      Overnight events/Subjective Assessment:    INTERPRETATION OF TELEMETRY (personally reviewed):    PAST MEDICAL & SURGICAL HISTORY:  No pertinent past medical history    Mitral valve replaced    CHF (congestive heart failure)    COPD exacerbation    Poor historian    H/O mitral valve replacement with mechanical valve    Mitral valve replaced    Poor historian        No Known Allergies      MEDICATIONS  (STANDING):  metoprolol tartrate 50 milliGRAM(s) Oral two times a day    MEDICATIONS  (PRN):  oxycodone    5 mG/acetaminophen 325 mG 1 Tablet(s) Oral every 6 hours PRN Severe Pain (7 - 10)      Vital Signs Last 24 Hrs  T(C): 37 (22 Jan 2021 04:03), Max: 37.3 (21 Jan 2021 17:52)  T(F): 98.6 (22 Jan 2021 04:03), Max: 99.1 (21 Jan 2021 17:52)  HR: 75 (22 Jan 2021 04:03) (75 - 77)  BP: 145/87 (22 Jan 2021 04:03) (140/70 - 155/81)  BP(mean): --  RR: 18 (22 Jan 2021 04:03) (18 - 19)  SpO2: 98% (22 Jan 2021 04:03) (94% - 100%)    I&O's Detail    I&O's Summary          PHYSICAL EXAM:  General: Appears well developed, well nourished, no acute distress. not in acute pain  HEAD: normal cephalic. Atraumatic  PUPILS: equal and reactive to light  EARS: normal hearing  NECK: supple. no JVD or HJR. no carotid bruits. no visible lymphadenopathy  NOSE: no gross abnormalities  CHEST: symmetric chest wall expansion  CARDIOVASCULAR: Normal rate. Regular rhythm. Normal S1 and S2, no S3/S4,  no murmur, rub, or gallop  LUNGS: Normal effort. Normal respiratory rate. Breath sounds are clear to auscultation bilaterally. No respiratory distress. No stridor.  no rales, rhonchi or wheeze. no decreased Breath sounds  ABDOMEN: Soft, nontender, non-distended, positive bowel sounds, no mass or bruit. no abdominal tenderness. No rebound. no ascites  EXTREMITIES: No clubbing, cyanosis or edema. normal range of motion  PULSES:  distal pulses WNL  SKIN: Warm and dry with normal turgor. no visible rash or cyanosis   NEURO: Alert & oriented x 3, grossly intact with no focal weakness  PSYCH: normal mood and affect. Grossly normal insight and judgement exhibited    FAMILY HISTORY:      SOCIAL HISTORY:   active smoking. No ETOH/No IVDA    REVIEW OF SYSTEMS:  Constitutional: no fever, chills or malaise. No weight loss  Head: no trauma  Eyes: no visual deficit. No double vision  Ears: no hearing deficit or ringing in the ears  Nose: no nose bleeds or smell changes or congestion  Throat: no difficult swallowing or painful swallowing  Neck: supple. No lymphadenopathy or swelling  Respiratory: no SOB, wheeze, asthma, COPD. No cough. No blood in the sputum  Cardiovascular: no CP, palpitations, irregular heart beats. No edema. No PND. No orthopnea. No skin/temperature or color changes  Gastrointestinal: no abdominal pain. No constipation. No diarrhea. No melena. No nausea. No vomiting. No bloating  Genitourinary: no frequency or urgency. No hematuria  Lymphatics: no grossly swollen lymph nodes  Musculoskeletal: no limitation of range of motion. Normal strength. No pain  Integumentary: no visible rash. No itching  Neurologic: no HA. No TIA or stroke symptoms. No seizure. No hx of epilepsy. No tingling or numbness. No weakness. No dizziness  Psychiatric: denied. Reports appropriate mood.        LABS:                        14.8   12.76 )-----------( 224      ( 21 Jan 2021 19:41 )             45.4     01-21    135  |  100  |  16.0  ----------------------------<  102<H>  4.4   |  25.0  |  1.21    Ca    9.8      21 Jan 2021 19:41    TPro  8.0  /  Alb  4.2  /  TBili  0.7  /  DBili  x   /  AST  94<H>  /  ALT  59<H>  /  AlkPhos  89  01-21        PT/INR - ( 21 Jan 2021 19:41 )   PT: 37.7 sec;   INR: 3.45 ratio         PTT - ( 21 Jan 2021 19:41 )  PTT:41.1 sec  serum  Lipids:         RADIOLOGY & ADDITIONAL STUDIES:    ECG: AF 70. normal axis and intervals. NSSTT        ASSESSMENT AND PLAN:  In summary, CODY ROCA is a 65y Female with past medical history significant for       Cortney Douglass MD                                                               [facility], [facility]                                                    [address], [address], [place] [address]                                                         PHONE: [phone]    FAX: [phone] [address], [address], [place] [address]                                                 PHONE: [phone]    FAX: [phone]  *******************************************************************************  cc: renal artery thromboembolic disease    HPI: 65F with microscopic hematuria and right flank pain x 1 month with right renal artery thromboembolic event. Hx of CHF, COPD, chronic Afib, Mechanical Mitral Valve Replacement 2016, TV repair, non obstructive coronaries (30-40% LCx). Pt on coumadin therapy. INR on presentation 3.43. Pt reports monthly coumadin monitoring as an outpt and reports last INR was 2.8. Pt denies active chest pain.  There is no SOB. No palpitations, PND or orthopnea. No dizziness or syncope reported. No fever, chills or constitutional symptoms.  Nuclear stress 2/14/20 no ischemia EF 69%  Echo 2/13/20 EF 50-55%, Mech MV, Tr MR, mild TR, normal EF  Carotid 2/13/20 1-15% LUKASZ, 16-49% LICA      Overnight events/Subjective Assessment: lack of sleep     INTERPRETATION OF TELEMETRY (personally reviewed):    PAST MEDICAL & SURGICAL HISTORY:  No pertinent past medical history    Mitral valve replaced    CHF (congestive heart failure)    COPD exacerbation    Poor historian    H/O mitral valve replacement with mechanical valve    Mitral valve replaced    Poor historian        No Known Allergies      MEDICATIONS  (STANDING):  metoprolol tartrate 50 milliGRAM(s) Oral two times a day    MEDICATIONS  (PRN):  oxycodone    5 mG/acetaminophen 325 mG 1 Tablet(s) Oral every 6 hours PRN Severe Pain (7 - 10)      Vital Signs Last 24 Hrs  T(C): 37 (22 Jan 2021 04:03), Max: 37.3 (21 Jan 2021 17:52)  T(F): 98.6 (22 Jan 2021 04:03), Max: 99.1 (21 Jan 2021 17:52)  HR: 75 (22 Jan 2021 04:03) (75 - 77)  BP: 145/87 (22 Jan 2021 04:03) (140/70 - 155/81)  BP(mean): --  RR: 18 (22 Jan 2021 04:03) (18 - 19)  SpO2: 98% (22 Jan 2021 04:03) (94% - 100%)    I&O's Detail    I&O's Summary          PHYSICAL EXAM:  General: Appears well developed, well nourished, no acute distress. not in acute pain. overweight  HEAD: normal cephalic. Atraumatic  PUPILS: equal and reactive to light  EARS: normal hearing  NECK: supple. no JVD or HJR. no carotid bruits. no visible lymphadenopathy  NOSE: no gross abnormalities  CHEST: symmetric chest wall expansion  CARDIOVASCULAR: Normal rate. irregular irregular rhythm. Normal S1 and S2, no S3/S4,  no murmur, rub, or gallop  LUNGS: Normal effort. Normal respiratory rate. Breath sounds are clear to auscultation bilaterally. No respiratory distress. No stridor.  no rales, rhonchi or wheeze. no decreased Breath sounds  ABDOMEN: Soft, nontender, non-distended, positive bowel sounds, no mass or bruit. no abdominal tenderness. No rebound. no ascites  EXTREMITIES: No clubbing, cyanosis or edema. normal range of motion  PULSES:  distal pulses WNL  SKIN: Warm and dry with normal turgor. no visible rash or cyanosis   NEURO: Alert & oriented x 3, grossly intact with no focal weakness  PSYCH: normal mood and affect. Grossly normal insight and judgement exhibited    FAMILY HISTORY: Mother, son and brother all had CHF. No family hx of ischemic heart disease reported for parents or 1st degree relatives      SOCIAL HISTORY:  former smoking. No ETOH/No IVDA    REVIEW OF SYSTEMS:  Constitutional: no fever, chills or malaise. No weight loss  Head: no trauma  Eyes: no visual deficit. No double vision  Ears: no hearing deficit or ringing in the ears  Nose: no nose bleeds or smell changes or congestion  Throat: no difficult swallowing or painful swallowing  Neck: supple. No lymphadenopathy or swelling  Respiratory: no SOB, wheeze, asthma, COPD. No cough. No blood in the sputum  Cardiovascular: no CP, palpitations, irregular heart beats. No edema. No PND. No orthopnea. No skin/temperature or color changes  Gastrointestinal: no abdominal pain. No constipation. No diarrhea. No melena. No nausea. No vomiting. No bloating  Genitourinary: no frequency or urgency. No hematuria  Lymphatics: no grossly swollen lymph nodes  Musculoskeletal: no limitation of range of motion. Normal strength. No pain  Integumentary: no visible rash. No itching  Neurologic: no HA. No TIA or stroke symptoms. No seizure. No hx of epilepsy. No tingling or numbness. No weakness. No dizziness  Psychiatric: denied. Reports appropriate mood.        LABS:                        14.8   12.76 )-----------( 224      ( 21 Jan 2021 19:41 )             45.4     01-21    135  |  100  |  16.0  ----------------------------<  102<H>  4.4   |  25.0  |  1.21    Ca    9.8      21 Jan 2021 19:41    TPro  8.0  /  Alb  4.2  /  TBili  0.7  /  DBili  x   /  AST  94<H>  /  ALT  59<H>  /  AlkPhos  89  01-21        PT/INR - ( 21 Jan 2021 19:41 )   PT: 37.7 sec;   INR: 3.45 ratio         PTT - ( 21 Jan 2021 19:41 )  PTT:41.1 sec  serum  Lipids:         RADIOLOGY & ADDITIONAL STUDIES:    ECG: AF 76. normal axis and intervals. NSSTT        ASSESSMENT AND PLAN:  In summary, CODY ROCA is a 65y Female with past medical history significant for microscopic hematuria and right flank pain x 1 month with right renal artery thromboembolic event. Hx of CHF, COPD, chronic Afib, Mechanical Mitral Valve Replacement 2016, TV repair, non obstructive coronaries (30-40% LCx). Pt on coumadin therapy. INR on presentation 3.43. Pt reports monthly coumadin monitoring as an outpt and reports last INR was 2.8. Pt denies active chest pain.  There is no SOB. No palpitations, PND or orthopnea. No dizziness or syncope reported. No fever, chills or constitutional symptoms.  Nuclear stress 2/14/20 no ischemia EF 69%  Echo 2/13/20 EF 50-55%, Mech MV, Tr MR, mild TR, normal EF  Carotid 2/13/20 1-15% LUKASZ, 16-49% LICA    - Likely embolic right renal artery event.  Pt reports INR last month was 2.8 and likely event occurred in light of a subtherapeutic INR.  Due to mechanical MV, and TV repair, would aim for INR 3-3.5 range and would add ASA    - Mechanical MV. TV repair. SBE prophylaxis for procedures that entail bacteremia    - CAF. Rate is controlled. Elevated [id number] score. coumadin as above    - No evidence of CHF.    - Nonobstructive CAD. Recent negative ischemic eval 2/14/20    - Microscopic hematuria, right flank pain. Outpt CT was obtained at that time demonstrating a filling defect in the right renal artery consistent with thromboembolic.    - As per vascular, no surgical intervention planned at this time.  Goal of INR should be 3 - 3.5 range and would add ASA    - Can consider hypercoagulable yip but I suspect embolic etiology due to subtherapeutic INR in light of mechanical MV.    - Stable CV status.    - Maintenance of coumadin with monitoring as per medical. Will likely need more frequent INR monitoring. This has been dw the pt    - Telemetry monitoring personally reviewed by me    - ECG personally reviewed by me    - radiologic imaging reviewed    - Laboratory data reviewed.    - I have personally reviewed all obtainable prior records and data    - We will follow on a PRN bases as needed.    - Outpt fu on DC via our office. Pt follows with Dr Sherman    Thank you for allowing me to participate in the care of your patient    Cortney Douglass MD                                                               [facility], [facility]                                                    [address], [address], [place] [address]                                                         PHONE: [phone]    FAX: [phone] [address] [address] [facility], [address], [place] [address]                                                 PHONE: [phone]    FAX: [phone]  *******************************************************************************  cc: renal artery thromboembolic disease    HPI: 65F with microscopic hematuria and right flank pain x 1 month with right renal artery thromboembolic event. Hx of CHF, COPD, chronic Afib, Mechanical Mitral Valve Replacement 2016, TV repair, non obstructive coronaries (30-40% LCx). Pt on coumadin therapy. INR on presentation 3.43. Pt reports monthly coumadin monitoring as an outpt and reports last INR was 2.8. Pt denies active chest pain.  There is no SOB. No palpitations, PND or orthopnea. No dizziness or syncope reported. No fever, chills or constitutional symptoms.  Nuclear stress 2/14/20 no ischemia EF 69%  Echo 2/13/20 EF 50-55%, Mech MV, Tr MR, mild TR, normal EF  Carotid 2/13/20 1-15% LUAKSZ, 16-49% LICA      Overnight events/Subjective Assessment: lack of sleep     INTERPRETATION OF TELEMETRY (personally reviewed):    PAST MEDICAL & SURGICAL HISTORY:  No pertinent past medical history    Mitral valve replaced    CHF (congestive heart failure)    COPD exacerbation    Poor historian    H/O mitral valve replacement with mechanical valve    Mitral valve replaced    Poor historian        No Known Allergies      MEDICATIONS  (STANDING):  metoprolol tartrate 50 milliGRAM(s) Oral two times a day    MEDICATIONS  (PRN):  oxycodone    5 mG/acetaminophen 325 mG 1 Tablet(s) Oral every 6 hours PRN Severe Pain (7 - 10)      Vital Signs Last 24 Hrs  T(C): 37 (22 Jan 2021 04:03), Max: 37.3 (21 Jan 2021 17:52)  T(F): 98.6 (22 Jan 2021 04:03), Max: 99.1 (21 Jan 2021 17:52)  HR: 75 (22 Jan 2021 04:03) (75 - 77)  BP: 145/87 (22 Jan 2021 04:03) (140/70 - 155/81)  BP(mean): --  RR: 18 (22 Jan 2021 04:03) (18 - 19)  SpO2: 98% (22 Jan 2021 04:03) (94% - 100%)    I&O's Detail    I&O's Summary          PHYSICAL EXAM:  General: Appears well developed, well nourished, no acute distress. not in acute pain. overweight  HEAD: normal cephalic. Atraumatic  PUPILS: equal and reactive to light  EARS: normal hearing  NECK: supple. no JVD or HJR. no carotid bruits. no visible lymphadenopathy  NOSE: no gross abnormalities  CHEST: symmetric chest wall expansion  CARDIOVASCULAR: Normal rate. irregular irregular rhythm. Normal S1 and S2, no S3/S4,  no murmur, rub, or gallop  LUNGS: Normal effort. Normal respiratory rate. Breath sounds are clear to auscultation bilaterally. No respiratory distress. No stridor.  no rales, rhonchi or wheeze. no decreased Breath sounds  ABDOMEN: Soft, nontender, non-distended, positive bowel sounds, no mass or bruit. no abdominal tenderness. No rebound. no ascites  EXTREMITIES: No clubbing, cyanosis or edema. normal range of motion  PULSES:  distal pulses WNL  SKIN: Warm and dry with normal turgor. no visible rash or cyanosis   NEURO: Alert & oriented x 3, grossly intact with no focal weakness  PSYCH: normal mood and affect. Grossly normal insight and judgement exhibited    FAMILY HISTORY: Mother, son and brother all had CHF. No family hx of ischemic heart disease reported for parents or 1st degree relatives      SOCIAL HISTORY:  former smoking. No ETOH/No IVDA    REVIEW OF SYSTEMS:  Constitutional: no fever, chills or malaise. No weight loss  Head: no trauma  Eyes: no visual deficit. No double vision  Ears: no hearing deficit or ringing in the ears  Nose: no nose bleeds or smell changes or congestion  Throat: no difficult swallowing or painful swallowing  Neck: supple. No lymphadenopathy or swelling  Respiratory: no SOB, wheeze, asthma, COPD. No cough. No blood in the sputum  Cardiovascular: no CP, palpitations, irregular heart beats. No edema. No PND. No orthopnea. No skin/temperature or color changes  Gastrointestinal: no abdominal pain. No constipation. No diarrhea. No melena. No nausea. No vomiting. No bloating  Genitourinary: no frequency or urgency. No hematuria  Lymphatics: no grossly swollen lymph nodes  Musculoskeletal: no limitation of range of motion. Normal strength. No pain  Integumentary: no visible rash. No itching  Neurologic: no HA. No TIA or stroke symptoms. No seizure. No hx of epilepsy. No tingling or numbness. No weakness. No dizziness  Psychiatric: denied. Reports appropriate mood.        LABS:                        14.8   12.76 )-----------( 224      ( 21 Jan 2021 19:41 )             45.4     01-21    135  |  100  |  16.0  ----------------------------<  102<H>  4.4   |  25.0  |  1.21    Ca    9.8      21 Jan 2021 19:41    TPro  8.0  /  Alb  4.2  /  TBili  0.7  /  DBili  x   /  AST  94<H>  /  ALT  59<H>  /  AlkPhos  89  01-21        PT/INR - ( 21 Jan 2021 19:41 )   PT: 37.7 sec;   INR: 3.45 ratio         PTT - ( 21 Jan 2021 19:41 )  PTT:41.1 sec  serum  Lipids:         RADIOLOGY & ADDITIONAL STUDIES:    ECG: AF 76. normal axis and intervals. NSSTT        ASSESSMENT AND PLAN:  In summary, CODY ROCA is a 65y Female with past medical history significant for microscopic hematuria and right flank pain x 1 month with right renal artery thromboembolic event. Hx of CHF, COPD, chronic Afib, Mechanical Mitral Valve Replacement 2016, TV repair, non obstructive coronaries (30-40% LCx). Pt on coumadin therapy. INR on presentation 3.43. Pt reports monthly coumadin monitoring as an outpt and reports last INR was 2.8. Pt denies active chest pain.  There is no SOB. No palpitations, PND or orthopnea. No dizziness or syncope reported. No fever, chills or constitutional symptoms.  Nuclear stress 2/14/20 no ischemia EF 69%  Echo 2/13/20 EF 50-55%, Mech MV, Tr MR, mild TR, normal EF  Carotid 2/13/20 1-15% LUKASZ, 16-49% LICA    - Likely embolic right renal artery event.  Pt reports INR last month was 2.8 and likely event occurred in light of a subtherapeutic INR.  Due to mechanical MV, and TV repair, would aim for INR 3-3.5 range and would add ASA    - Mechanical MV. TV repair. SBE prophylaxis for procedures that entail bacteremia    - CAF. Rate is controlled. Elevated [id number] score. coumadin as above    - No evidence of CHF.    - Nonobstructive CAD. Recent negative ischemic eval 2/14/20    - Microscopic hematuria, right flank pain. Outpt CT was obtained at that time demonstrating a filling defect in the right renal artery consistent with thromboembolic.    - As per vascular, no surgical intervention planned at this time.  Goal of INR should be 3 - 3.5 range and would add ASA    - Can consider hypercoagulable yip but I suspect embolic etiology due to subtherapeutic INR in light of mechanical MV.    - Stable CV status.    - Maintenance of coumadin with monitoring as per medical. Will likely need more frequent INR monitoring. This has been dw the pt    - Telemetry monitoring personally reviewed by me    - ECG personally reviewed by me    - radiologic imaging reviewed    - Laboratory data reviewed.    - I have personally reviewed all obtainable prior records and data    - We will follow on a PRN bases as needed.    - Outpt fu on DC via our office. Pt follows with Dr Sherman    Thank you for allowing me to participate in the care of your patient    Cortney Douglass MD

## 2021-01-22 NOTE — CONSULT NOTE ADULT - ASSESSMENT
1) Renal infarction  2) ASH  3) CHF  4) Mechanical MV    Anticoagulation per cardiology;   Trend renal function  Outpatient follow up  Office information provided  SCr stable   OK To discharge from renal standpoint    dw ER

## 2021-01-22 NOTE — ED CDU PROVIDER DISPOSITION NOTE - NSFOLLOWUPINSTRUCTIONS_ED_ALL_ED_FT
- add aspirin 81mg daily to your medications  - continue all other home medications as directed  - call to make a follow up appointment with cardiology in 1-2 weeks  _ call to make an appointment with the kidney doctor Dr Alexander for 1-2 weeks follow up  - make an appointment with a Hematologist at [facility] to get a hypercoagulable work up to find out if there is a reason why you developed a clot while on coumadin   - return to ED with any worsening symptoms or concerns.

## 2021-01-22 NOTE — CONSULT NOTE ADULT - SUBJECTIVE AND OBJECTIVE BOX
[facility] DIVISION OF KIDNEY DISEASES AND HYPERTENSION -- INITIAL CONSULT NOTE  --------------------------------------------------------------------------------  HPI:   Ms. Mayfield is a 65F, PMH of CHF, COPD, Afib, Mitral Valve Replacement; therapeutic on Coumadin, presenting with 4-weeks of worsening right flank pain. Patient denies trauma to the area or similar pain in the past. Pain is associated with abdominal discomfort and low appetite. Denies associated weight loss, extremity swelling, CP, or SOB. Patient evaluated by PCP and found to have microscopic hematuria. A CT A/P was obtained at that time demonstrating a filling defect in the right renal artery consistent with thromboembolic disease. Nephrology consulted for renal infarctiiotn      PAST HISTORY  --------------------------------------------------------------------------------  PAST MEDICAL & SURGICAL HISTORY:  No pertinent past medical history    Mitral valve replaced    CHF (congestive heart failure)    COPD exacerbation    Poor historian    H/O mitral valve replacement with mechanical valve    Mitral valve replaced    Poor historian      FAMILY HISTORY:    PAST SOCIAL HISTORY:    ALLERGIES & MEDICATIONS  --------------------------------------------------------------------------------  Allergies    No Known Allergies    Intolerances      Standing Inpatient Medications  aspirin enteric coated 81 milliGRAM(s) Oral daily  metoprolol tartrate 50 milliGRAM(s) Oral two times a day    PRN Inpatient Medications  oxycodone    5 mG/acetaminophen 325 mG 1 Tablet(s) Oral every 6 hours PRN      REVIEW OF SYSTEMS  --------------------------------------------------------------------------------  Gen: No weight changes, fatigue, fevers/chills, weakness  Skin: No rashes  Head/Eyes/Ears/Mouth: No headache; Normal hearing; Normal vision w/o blurriness; No sinus pain/discomfort, sore throat  Respiratory: No dyspnea, cough, wheezing, hemoptysis  CV: No chest pain, PND, orthopnea  GI: No abdominal pain, diarrhea, constipation, nausea, vomiting, melena, hematochezia  : No increased frequency, dysuria, hematuria, nocturia  MSK: No joint pain/swelling; no back pain; no edema  Neuro: No dizziness/lightheadedness, weakness, seizures, numbness, tingling  Heme: No easy bruising or bleeding  Endo: No heat/cold intolerance  Psych: No significant nervousness, anxiety, stress, depression    All other systems were reviewed and are negative, except as noted.    VITALS/PHYSICAL EXAM  --------------------------------------------------------------------------------  T(C): 37 (01-22-21 @ 11:31), Max: 37.3 (01-21-21 @ 17:52)  HR: 74 (01-22-21 @ 11:31) (64 - 77)  BP: 114/75 (01-22-21 @ 11:31) (114/75 - 155/81)  RR: 20 (01-22-21 @ 11:31) (18 - 20)  SpO2: 95% (01-22-21 @ 11:31) (94% - 100%)  Wt(kg): --  Height (cm): 152.4 (01-21-21 @ 17:52)  Weight (kg): 83.5 (01-21-21 @ 17:52)  BMI (kg/m2): 36 (01-21-21 @ 17:52)  BSA (m2): 1.8 (01-21-21 @ 17:52)      Physical Exam:  	Gen: NAD, well-appearing  	HEENT: PERRL, supple neck, clear oropharynx  	Pulm: CTA B/L  	CV: RRR, S1S2; no rub  	Back: No spinal or CVA tenderness; no sacral edema  	Abd: +BS, soft, nontender/nondistended  	: No suprapubic tenderness  	UE: Warm, FROM, no clubbing, intact strength; no edema; no asterixis  	LE: Warm, FROM, no clubbing, intact strength; no edema  	Neuro: No focal deficits, intact gait  	Psych: Normal affect and mood  	Skin: Warm, without rashes  	Vascular access:    LABS/STUDIES  --------------------------------------------------------------------------------              14.8   12.76 >-----------<  224      [01-21-21 @ 19:41]              45.4     135  |  100  |  16.0  ----------------------------<  102      [01-21-21 @ 19:41]  4.4   |  25.0  |  1.21        Ca     9.8     [01-21-21 @ 19:41]    TPro  8.0  /  Alb  4.2  /  TBili  0.7  /  DBili  x   /  AST  94  /  ALT  59  /  AlkPhos  89  [01-21-21 @ 19:41]    PT/INR: PT 37.7 , INR 3.45       [01-21-21 @ 19:41]  PTT: 41.1       [01-21-21 @ 19:41]    Troponin <0.01      [01-22-21 @ 07:41]    Creatinine Trend:  SCr 1.21 [01-21 @ 19:41]    Urinalysis - [01-22-21 @ 08:47]      Color Linda / Appearance Clear / SG 1.025 / pH 5.0      Gluc Negative / Ketone Trace  / Bili Negative / Urobili Negative       Blood Moderate / Protein 100 / Leuk Est Negative / Nitrite Negative      RBC 3-5 / WBC 3-5 / Hyaline  / Gran  / Sq Epi  / Non Sq Epi Occasional / Bacteria Negative      HbA1c 5.5      [10-06-16 @ 01:29]  Lipid: chol 124, TG 81, HDL 41, LDL --      [01-22-21 @ 07:41]

## 2021-01-22 NOTE — ED CDU PROVIDER SUBSEQUENT DAY NOTE - ATTENDING CONTRIBUTION TO CARE
Eliel nice-66 y/o f with  new rt renal infarct p/w  flank pain and placed in obs for further work up, inr 3.45    pt is alert and seen by vascular surgery , no acute interventions, to be followed by cardio, will call renal

## 2021-01-22 NOTE — ED CDU PROVIDER SUBSEQUENT DAY NOTE - MEDICAL DECISION MAKING DETAILS
66 yo female newly found right renal infarct placed in observation for cards consult and tte as per surgery recs

## 2021-01-22 NOTE — ED ADULT NURSE REASSESSMENT NOTE - NS ED NURSE REASSESS COMMENT FT1
assumed care of pt @ this time. received pt sleeping on recliner, easily arousable, telebox in place. pt c/o 5/10 lower back pain but refuses pain medication at this time. pt aware of plan of care at this time. awaiting echo. vitals stable. will continue to reassess.

## 2021-01-22 NOTE — ED CDU PROVIDER DISPOSITION NOTE - CLINICAL COURSE
64 yo female hx of copd chf  afib on coumadin MVR followed by [facility]ology presenting with outpatient testing positive for right renal artery infarct. no hx of blood clots. notes that she is compliant on all medications, notes that she has been having lower back pain for about a month, no dysuria or changes in frequency of urination.   Troponin negative, EKG no change, INR 3.45 in therapeutic range. Seen by vascular, no surgical intervention.   Seen by Dr Douglass recommend adding asa 81mg, TTE results reviewed with Dr Douglass, Grade III diastolic dysfunction and severe pulmonary HTN, can f/u outpatient.  Seen by Dr Alexander of nephrology can f/u 2 weeks in the office.  Referred to [facility] for hypercoagulable work up.

## 2021-01-22 NOTE — ED CDU PROVIDER DISPOSITION NOTE - CARE PROVIDER_API CALL
Cortney Douglass)  Cardiovascular Disease; Internal Medicine  [address], [address]  [place] [address]  Phone: [phone]  Fax: [phone]  Follow Up Time:     Michele Alexander ()  Internal Medicine  [address], [address] [place] [address]  Phone: [phone]  Fax: [phone]  Follow Up Time:

## 2021-01-22 NOTE — ED CDU PROVIDER DISPOSITION NOTE - ATTENDING CONTRIBUTION TO CARE
Eliel nice-66 y/o f with  new rt renal infarct p/w  flank pain and placed in obs for further work up, inr 3.45    pt is alert and seen by vascular surgery , no acute interventions, to be followed by cardio, will call renal.

## 2021-01-22 NOTE — ED CDU PROVIDER DISPOSITION NOTE - PATIENT PORTAL LINK FT
You can access the FollowMyHealth Patient Portal offered by [facility] by registering at the following website: [email or web address]. By joining [facility]’s FollowMyHealth portal, you will also be able to view your health information using other applications (apps) compatible with our system.

## 2021-01-23 PROCEDURE — 99233 SBSQ HOSP IP/OBS HIGH 50: CPT

## 2021-01-23 NOTE — PROGRESS NOTE ADULT - SUBJECTIVE AND OBJECTIVE BOX
HPI:   Ms. Mayfield is a 65F, PMH of CHF, COPD, Afib, Mitral Valve Replacement; therapeutic on Coumadin, presenting with 4-weeks of worsening right flank pain. Patient denies trauma to the area or similar pain in the past. Pain is associated with abdominal discomfort and low appetite. Denies associated weight loss, extremity swelling, CP, or SOB. Patient evaluated by PCP and found to have microscopic hematuria. A CT A/P was obtained at that time demonstrating a filling defect in the right renal artery consistent with thromboembolic disease. Nephrology consulted for renal infarctiiotn    PAST HISTORY  --------------------------------------------------------------------------------  PAST MEDICAL & SURGICAL HISTORY:  No pertinent past medical history    Mitral valve replaced    CHF (congestive heart failure)    COPD exacerbation    Poor historian    H/O mitral valve replacement with mechanical valve    Mitral valve replaced    FAMILY HISTORY:    PAST SOCIAL HISTORY:    ALLERGIES & MEDICATIONS  --------------------------------------------------------------------------------  Allergies    No Known Allergies    Standing Inpatient Medications  aspirin enteric coated 81 milliGRAM(s) Oral daily  metoprolol tartrate 50 milliGRAM(s) Oral two times a day    PRN Inpatient Medications  oxycodone    5 mG/acetaminophen 325 mG 1 Tablet(s) Oral every 6 hours PRN    REVIEW OF SYSTEMS  --------------------------------------------------------------------------------  Gen: No weight changes, fatigue, fevers/chills, weakness  Skin: No rashes  Head/Eyes/Ears/Mouth: No headache; Normal hearing; Normal vision w/o blurriness; No sinus pain/discomfort, sore throat  Respiratory: No dyspnea, cough, wheezing, hemoptysis  CV: No chest pain, PND, orthopnea  GI: No abdominal pain, diarrhea, constipation, nausea, vomiting, melena, hematochezia  : No increased frequency, dysuria, hematuria, nocturia  MSK: No joint pain/swelling; no back pain; no edema  Neuro: No dizziness/lightheadedness, weakness, seizures, numbness, tingling  Heme: No easy bruising or bleeding  Endo: No heat/cold intolerance  Psych: No significant nervousness, anxiety, stress, depression    All other systems were reviewed and are negative, except as noted.    VITALS/PHYSICAL EXAM  --------------------------------------------------------------------------------  T(C): 37 (21 @ 11:31), Max: 37.3 (21 @ 17:52)  HR: 74 (21 @ 11:31) (64 - 77)  BP: 114/75 (21 @ 11:31) (114/75 - 155/81)  RR: 20 (21 @ 11:31) (18 - 20)  SpO2: 95% (21 @ 11:31) (94% - 100%)  Height (cm): 152.4 (21 @ 17:52)  Weight (kg): 83.5 (21 @ 17:52)  BMI (kg/m2): 36 (21 @ 17:52)  BSA (m2): 1.8 (21 @ 17:52)    Physical Exam:  	Gen: NAD, well-appearing  	HEENT: PERRL, supple neck, clear oropharynx  	Pulm: CTA B/L  	CV: RRR, S1S2; no rub  	Back: No spinal or CVA tenderness; no sacral edema  	Abd: +BS, soft, nontender/nondistended  	: No suprapubic tenderness  	UE: Warm, FROM, no clubbing, intact strength; no edema; no asterixis  	LE: Warm, FROM, no clubbing, intact strength; no edema  	Neuro: No focal deficits, intact gait  	Psych: Normal affect and mood  	Skin: Warm, without rashes  	Vascular access:    LABS/STUDIES:    TPro  8.0    /  Alb  4.2    /  TBili  0.7    /  DBili  x      /  AST  94<H>  /  ALT  59<H>  /  AlkPhos  89     2021 19:41    Urinalysis Basic - ( 2021 08:47 )  Color: Linda<!> / Appearance: Clear / S.025 / pH: x  Gluc: x / Ketone: Trace<!>  / Bili: Negative / Urobili: Negative mg/dL   Blood: x / Protein: 100 mg/dL<!> / Nitrite: Negative   Leuk Esterase: Negative / RBC: 3-5 /HPF<!> / WBC 3-5   Sq Epi: x / Non Sq Epi: Occasional / Bacteria: Negative,    --------------------------------------------------------------------------------              14.8   12.76 >-----------<  224      [21 @ 19:41]              45.4     135  |  100  |  16.0  ----------------------------<  102      [21 @ 19:41]  4.4   |  25.0  |  1.21        Ca     9.8     [21 @ 19:41]    TPro  8.0  /  Alb  4.2  /  TBili  0.7  /  DBili  x   /  AST  94  /  ALT  59  /  AlkPhos  89  [21 @ 19:41]    PT/INR: PT 37.7 , INR 3.45       [21 @ 19:41]  PTT: 41.1       [21 @ 19:41]    Troponin <0.01      [21 @ 07:41]    Creatinine Trend:  SCr 1.21 [ @ 19:41]    Urinalysis - [21 @ 08:47]      Color Linda / Appearance Clear / SG 1.025 / pH 5.0      Gluc Negative / Ketone Trace  / Bili Negative / Urobili Negative       Blood Moderate / Protein 100 / Leuk Est Negative / Nitrite Negative      RBC 3-5 / WBC 3-5 / Hyaline  / Gran  / Sq Epi  / Non Sq Epi Occasional / Bacteria Negative    HbA1c 5.5      [10-06-16 @ 01:29]  Lipid: chol 124, TG 81, HDL 41, LDL --      [21 @ 07:41]    1) Renal infarct,  2) ASH  3) CHF  4) Mechanical MV    Anticoagulation per cardiology;     Outpatient follow up    SCr.,  stable

## 2021-01-28 ENCOUNTER — APPOINTMENT (OUTPATIENT)
Dept: NEPHROLOGY | Facility: CLINIC | Age: 66
End: 2021-01-28
Payer: MEDICARE

## 2021-01-28 VITALS
TEMPERATURE: 98 F | BODY MASS INDEX: 35.34 KG/M2 | OXYGEN SATURATION: 91 % | HEIGHT: 60 IN | WEIGHT: 180 LBS | DIASTOLIC BLOOD PRESSURE: 80 MMHG | SYSTOLIC BLOOD PRESSURE: 149 MMHG | HEART RATE: 70 BPM

## 2021-01-28 DIAGNOSIS — Z86.79 PERSONAL HISTORY OF OTHER DISEASES OF THE CIRCULATORY SYSTEM: ICD-10-CM

## 2021-01-28 PROCEDURE — 99496 TRANSJ CARE MGMT HIGH F2F 7D: CPT

## 2021-01-28 NOTE — ASSESSMENT
[FreeTextEntry1] : 1) Renal infarction\par 2) ASH\par 3) CHF\par 4) Mechanical MV\par \par Blood work\par CBC, renal panel, UA, urine pro/cr ratio, A1C\par US renal\par RTC 1 mo

## 2021-01-28 NOTE — PHYSICAL EXAM
[General Appearance - Alert] : alert [General Appearance - In No Acute Distress] : in no acute distress [General Appearance - Well Nourished] : well nourished [General Appearance - Well Developed] : well developed [Sclera] : the sclera and conjunctiva were normal [Outer Ear] : the ears and nose were normal in appearance [] : the neck was supple [Neck Appearance] : the appearance of the neck was normal [Neck Cervical Mass (___cm)] : no neck mass was observed [Respiration, Rhythm And Depth] : normal respiratory rhythm and effort [Auscultation Breath Sounds / Voice Sounds] : lungs were clear to auscultation bilaterally [Heart Rate And Rhythm] : heart rate was normal and rhythm regular [Heart Sounds] : normal S1 and S2 [Arterial Pulses Carotid] : carotid pulses were normal with no bruits [Bowel Sounds] : normal bowel sounds [Abdomen Soft] : soft [Abdomen Tenderness] : non-tender [Cervical Lymph Nodes Enlarged Posterior Bilaterally] : posterior cervical [No CVA Tenderness] : no ~M costovertebral angle tenderness [Abnormal Walk] : normal gait [Musculoskeletal - Swelling] : no joint swelling seen [Skin Color & Pigmentation] : normal skin color and pigmentation [Skin Turgor] : normal skin turgor [Cranial Nerves] : cranial nerves 2-12 were intact [Oriented To Time, Place, And Person] : oriented to person, place, and time [Impaired Insight] : insight and judgment were intact

## 2021-01-28 NOTE — HISTORY OF PRESENT ILLNESS
[FreeTextEntry1] : 65F, PMH of CHF, COPD, Afib, Mitral Valve Replacement; therapeutic on Coumadin, presenting with 4-weeks of worsening right flank pain. Patient denies trauma to the area or similar pain in the past. Pain is associated with abdominal discomfort and low appetite. Denies associated weight loss, extremity swelling, CP, or SOB. Patient evaluated by PCP and found to have microscopic hematuria. A CT A/P was obtained at that time demonstrating a filling defect in the right renal artery consistent with thromboembolic disease. Nephrology consulted for renal infarctiiotn. Pt was admitted to [facility] 1/21/21-1/23/21 and f/u by me within 24 hours of discharge.

## 2021-02-02 LAB
ALBUMIN SERPL ELPH-MCNC: 4.5 G/DL
ANION GAP SERPL CALC-SCNC: 12 MMOL/L
APPEARANCE: CLEAR
BACTERIA: NEGATIVE
BASOPHILS # BLD AUTO: 0.06 K/UL
BASOPHILS NFR BLD AUTO: 1 %
BILIRUBIN URINE: NEGATIVE
BLOOD URINE: ABNORMAL
BUN SERPL-MCNC: 15 MG/DL
CALCIUM SERPL-MCNC: 9.9 MG/DL
CHLORIDE SERPL-SCNC: 99 MMOL/L
CO2 SERPL-SCNC: 26 MMOL/L
COLOR: NORMAL
CREAT SERPL-MCNC: 1.26 MG/DL
CREAT SPEC-SCNC: 102 MG/DL
CREAT/PROT UR: 0.2 RATIO
EOSINOPHIL # BLD AUTO: 0.11 K/UL
EOSINOPHIL NFR BLD AUTO: 1.9 %
ESTIMATED AVERAGE GLUCOSE: 126 MG/DL
GLUCOSE QUALITATIVE U: NEGATIVE
GLUCOSE SERPL-MCNC: 114 MG/DL
HBA1C MFR BLD HPLC: 6 %
HCT VFR BLD CALC: 44.4 %
HGB BLD-MCNC: 13.7 G/DL
HYALINE CASTS: 1 /LPF
IMM GRANULOCYTES NFR BLD AUTO: 0.3 %
KETONES URINE: NEGATIVE
LEUKOCYTE ESTERASE URINE: NEGATIVE
LYMPHOCYTES # BLD AUTO: 1.03 K/UL
LYMPHOCYTES NFR BLD AUTO: 17.9 %
MAN DIFF?: NORMAL
MCHC RBC-ENTMCNC: 28.7 PG
MCHC RBC-ENTMCNC: 30.9 GM/DL
MCV RBC AUTO: 93.1 FL
MICROSCOPIC-UA: NORMAL
MONOCYTES # BLD AUTO: 0.59 K/UL
MONOCYTES NFR BLD AUTO: 10.2 %
NEUTROPHILS # BLD AUTO: 3.95 K/UL
NEUTROPHILS NFR BLD AUTO: 68.7 %
NITRITE URINE: NEGATIVE
PH URINE: 6.5
PHOSPHATE SERPL-MCNC: 3.6 MG/DL
PLATELET # BLD AUTO: 315 K/UL
POTASSIUM SERPL-SCNC: 4.7 MMOL/L
PROT UR-MCNC: 24 MG/DL
PROTEIN URINE: NORMAL
RBC # BLD: 4.77 M/UL
RBC # FLD: 13.6 %
RED BLOOD CELLS URINE: 3 /HPF
SODIUM SERPL-SCNC: 137 MMOL/L
SPECIFIC GRAVITY URINE: 1.02
SQUAMOUS EPITHELIAL CELLS: 4 /HPF
UROBILINOGEN URINE: NORMAL
WBC # FLD AUTO: 5.76 K/UL
WHITE BLOOD CELLS URINE: 6 /HPF

## 2021-02-17 ENCOUNTER — OUTPATIENT (OUTPATIENT)
Dept: OUTPATIENT SERVICES | Facility: HOSPITAL | Age: 66
LOS: 1 days | Discharge: HOME | End: 2021-02-17

## 2021-02-17 DIAGNOSIS — Z78.9 OTHER SPECIFIED HEALTH STATUS: Chronic | ICD-10-CM

## 2021-02-17 DIAGNOSIS — D72.89 OTHER SPECIFIED DISORDERS OF WHITE BLOOD CELLS: ICD-10-CM

## 2021-02-17 DIAGNOSIS — Z95.2 PRESENCE OF PROSTHETIC HEART VALVE: Chronic | ICD-10-CM

## 2021-02-18 ENCOUNTER — APPOINTMENT (OUTPATIENT)
Dept: HEMATOLOGY ONCOLOGY | Facility: CLINIC | Age: 66
End: 2021-02-18

## 2021-02-18 ENCOUNTER — TRANSCRIPTION ENCOUNTER (OUTPATIENT)
Age: 66
End: 2021-02-18

## 2021-02-18 ENCOUNTER — APPOINTMENT (OUTPATIENT)
Dept: HEMATOLOGY ONCOLOGY | Facility: CLINIC | Age: 66
End: 2021-02-18
Payer: MEDICARE

## 2021-02-18 PROCEDURE — 99202 OFFICE O/P NEW SF 15 MIN: CPT | Mod: 95

## 2021-02-18 NOTE — REVIEW OF SYSTEMS
[Fatigue] : fatigue [SOB on Exertion] : shortness of breath during exertion [Negative] : Constitutional [FreeTextEntry7] : Flank pain resolved

## 2021-02-18 NOTE — HISTORY OF PRESENT ILLNESS
[Home] : at home, [unfilled] , at the time of the visit. [Medical Office: ([facility])___] : at the medical office located in  [Verbal consent obtained from patient] : the patient, [unfilled] [de-identified] : This 65-year-old woman has a history of congestive heart failure and atrial fibrillation, status post mechanical mitral valve replacement which was performed in October 2016 for nonrheumatic mitral stenosis.  She has also had replacement of the aortic and tricuspid valves.  Dr. Linda Robertson monitors her warfarin anticoagulation and she is also on aspirin 81 mg.  Target INR has been between 2.5 and 3.  A recent overshoot INR greater than 5 required holding warfarin for a brief period of time.\par In January she presented with worsening right flank pain and was found on CT of the abdomen to have right renal infarction secondary to thromboembolic disease.  Recent renal function studies show a BUN of 15 and creatinine 1.26.\par There is no prior history of thrombosis.\par

## 2021-02-18 NOTE — REASON FOR VISIT
[Initial Consultation] : an initial consultation for [FreeTextEntry2] : Thromboembolic renal infarction

## 2021-02-18 NOTE — ASSESSMENT
[FreeTextEntry1] : 65-year-old woman with mechanical mitral tricuspid and aortic valve replacements, maximally anticoagulated with Coumadin and aspirin 81 mg, now status post thromboembolic renal infarction.\par The etiology here is felt to relate to the patient's mechanical valve replacement and is not indicative of underlying hypercoagulable disorder.\par Suggest continuation of warfarin and aspirin as presently ordered.\par Available as needed.\par \par 20 minutes spent reviewing past history, discussing current clinical status, and planning future care.

## 2021-04-13 ENCOUNTER — APPOINTMENT (OUTPATIENT)
Dept: PULMONOLOGY | Facility: CLINIC | Age: 66
End: 2021-04-13
Payer: MEDICARE

## 2021-04-13 VITALS
RESPIRATION RATE: 16 BRPM | WEIGHT: 182 LBS | OXYGEN SATURATION: 95 % | SYSTOLIC BLOOD PRESSURE: 138 MMHG | HEIGHT: 60 IN | HEART RATE: 82 BPM | BODY MASS INDEX: 35.73 KG/M2 | DIASTOLIC BLOOD PRESSURE: 82 MMHG | TEMPERATURE: 98 F

## 2021-04-13 DIAGNOSIS — Z87.891 PERSONAL HISTORY OF NICOTINE DEPENDENCE: ICD-10-CM

## 2021-04-13 PROCEDURE — 99214 OFFICE O/P EST MOD 30 MIN: CPT

## 2021-04-13 NOTE — PHYSICAL EXAM
[No Acute Distress] : no acute distress [Normal Oropharynx] : normal oropharynx [Normal Appearance] : normal appearance [No Neck Mass] : no neck mass [Normal Rate/Rhythm] : normal rate/rhythm [Normal S1, S2] : normal s1, s2 [No Murmurs] : no murmurs [No Resp Distress] : no resp distress [No Acc Muscle Use] : no acc muscle use [Normal Rhythm and Effort] : normal rhythm and effort [Clear to Auscultation Bilaterally] : clear to auscultation bilaterally [No Abnormalities] : no abnormalities [Benign] : benign [Normal Gait] : normal gait [No Clubbing] : no clubbing [No Cyanosis] : no cyanosis [No Edema] : no edema [FROM] : FROM [Normal Color/ Pigmentation] : normal color/ pigmentation [No Focal Deficits] : no focal deficits [Oriented x3] : oriented x3 [Normal Affect] : normal affect [TextBox_54] : Mechanical valve snap with systole

## 2021-04-13 NOTE — CONSULT LETTER
[Dear  ___] : Dear  [unfilled], [Courtesy Letter:] : I had the pleasure of seeing your patient, [unfilled], in my office today. [Please see my note below.] : Please see my note below. [Consult Closing:] : Thank you very much for allowing me to participate in the care of this patient.  If you have any questions, please do not hesitate to contact me. [Sincerely,] : Sincerely, [FreeTextEntry3] : Ben Moss MD\par  [DrRadha  ___] : Dr. GUZMAN

## 2021-04-13 NOTE — REASON FOR VISIT
[Follow-Up] : a follow-up visit [COPD] : COPD [Pulmonary Hypertension] : pulmonary hypertension [Shortness of Breath] : shortness of breath

## 2021-04-13 NOTE — REVIEW OF SYSTEMS
[Cough] : no cough [SOB on Exertion] : sob on exertion [Chest Discomfort] : no chest discomfort [Edema] : no edema [Orthopnea] : no orthopnea [PND] : no PND [Negative] : Endocrine [TextBox_30] : Per HPI

## 2021-04-15 ENCOUNTER — APPOINTMENT (OUTPATIENT)
Dept: NEPHROLOGY | Facility: CLINIC | Age: 66
End: 2021-04-15

## 2021-04-29 ENCOUNTER — APPOINTMENT (OUTPATIENT)
Dept: DERMATOLOGY | Facility: CLINIC | Age: 66
End: 2021-04-29
Payer: MEDICARE

## 2021-04-29 PROCEDURE — 99203 OFFICE O/P NEW LOW 30 MIN: CPT

## 2021-04-30 ENCOUNTER — APPOINTMENT (OUTPATIENT)
Dept: DISASTER EMERGENCY | Facility: CLINIC | Age: 66
End: 2021-04-30

## 2021-05-04 LAB — SARS-COV-2 N GENE NPH QL NAA+PROBE: NOT DETECTED

## 2021-05-05 ENCOUNTER — APPOINTMENT (OUTPATIENT)
Dept: PULMONOLOGY | Facility: CLINIC | Age: 66
End: 2021-05-05
Payer: MEDICARE

## 2021-05-05 VITALS — TEMPERATURE: 98.9 F | BODY MASS INDEX: 36.89 KG/M2 | WEIGHT: 183 LBS | HEIGHT: 59.25 IN

## 2021-05-05 VITALS — OXYGEN SATURATION: 96 %

## 2021-05-05 VITALS
HEART RATE: 74 BPM | SYSTOLIC BLOOD PRESSURE: 130 MMHG | WEIGHT: 182 LBS | BODY MASS INDEX: 35.73 KG/M2 | HEIGHT: 60 IN | OXYGEN SATURATION: 92 % | DIASTOLIC BLOOD PRESSURE: 80 MMHG

## 2021-05-05 DIAGNOSIS — Z23 ENCOUNTER FOR IMMUNIZATION: ICD-10-CM

## 2021-05-05 PROCEDURE — 94010 BREATHING CAPACITY TEST: CPT

## 2021-05-05 PROCEDURE — 99214 OFFICE O/P EST MOD 30 MIN: CPT | Mod: 25

## 2021-05-05 NOTE — CONSULT LETTER
[Dear  ___] : Dear  [unfilled], [Courtesy Letter:] : I had the pleasure of seeing your patient, [unfilled], in my office today. [Please see my note below.] : Please see my note below. [Consult Closing:] : Thank you very much for allowing me to participate in the care of this patient.  If you have any questions, please do not hesitate to contact me. [Sincerely,] : Sincerely, [FreeTextEntry3] : Ben Moss MD\par

## 2021-05-05 NOTE — COUNSELING
[Potential consequences of obesity discussed] : Potential consequences of obesity discussed [Benefits of weight loss discussed] : Benefits of weight loss discussed [Structured Weight Management Program suggested:] : Structured weight management program suggested [Encouraged to increase physical activity] : Encouraged to increase physical activity [Target Wt Loss Goal ___] : Weight Loss Goals: Target weight loss goal [unfilled] lbs [FreeTextEntry1] : Dr. Otero

## 2021-05-05 NOTE — REASON FOR VISIT
[Follow-Up] : a follow-up visit [COPD] : COPD [Pulmonary Hypertension] : pulmonary hypertension [Shortness of Breath] : shortness of breath [Obesity] : obesity

## 2021-05-28 ENCOUNTER — APPOINTMENT (OUTPATIENT)
Dept: DERMATOLOGY | Facility: CLINIC | Age: 66
End: 2021-05-28
Payer: MEDICARE

## 2021-05-28 PROCEDURE — 99214 OFFICE O/P EST MOD 30 MIN: CPT

## 2021-06-14 ENCOUNTER — APPOINTMENT (OUTPATIENT)
Dept: BARIATRICS/WEIGHT MGMT | Facility: CLINIC | Age: 66
End: 2021-06-14
Payer: MEDICARE

## 2021-06-14 VITALS
DIASTOLIC BLOOD PRESSURE: 86 MMHG | BODY MASS INDEX: 36.59 KG/M2 | SYSTOLIC BLOOD PRESSURE: 135 MMHG | TEMPERATURE: 97.4 F | WEIGHT: 181.5 LBS | OXYGEN SATURATION: 96 % | HEART RATE: 97 BPM | HEIGHT: 59 IN

## 2021-06-14 DIAGNOSIS — I25.10 ATHEROSCLEROTIC HEART DISEASE OF NATIVE CORONARY ARTERY W/OUT ANGINA PECTORIS: ICD-10-CM

## 2021-06-14 DIAGNOSIS — I50.9 HEART FAILURE, UNSPECIFIED: ICD-10-CM

## 2021-06-14 DIAGNOSIS — Z95.2 PRESENCE OF PROSTHETIC HEART VALVE: ICD-10-CM

## 2021-06-14 DIAGNOSIS — N28.0 ISCHEMIA AND INFARCTION OF KIDNEY: ICD-10-CM

## 2021-06-14 DIAGNOSIS — R73.9 HYPERGLYCEMIA, UNSPECIFIED: ICD-10-CM

## 2021-06-14 PROCEDURE — 99204 OFFICE O/P NEW MOD 45 MIN: CPT

## 2021-06-14 RX ORDER — ATORVASTATIN CALCIUM 10 MG/1
10 TABLET, FILM COATED ORAL
Refills: 0 | Status: ACTIVE | COMMUNITY
Start: 2021-06-14

## 2021-06-14 RX ORDER — FUROSEMIDE 80 MG/1
TABLET ORAL
Refills: 0 | Status: ACTIVE | COMMUNITY

## 2021-06-14 NOTE — ASSESSMENT
[FreeTextEntry1] : 66 year old woman with a history of MVR, TVR ? AVR, who is interested in wt loss. She is at her highest wt( 181 at 4'10", a former . She does not eat badly, limited in her vegetables due to her Coumadin, but eating a lot of cheese and nuts . She has an elevated A1c of 6.0 . She will need \par 1. labs \par 2 cont to walk  with the goal of daily \par 3 nutritional counseling\par 4 Nutrition classes \par 5 consider metformin although she has a hx of CHF , need echo prior , may be a candidate for glp however she has medicare and is likely not covered\par 6 log her food  and bring in the journal \par 7 . no eating after 7 pm \par 8 follow up in 2-3 weeks

## 2021-06-14 NOTE — PHYSICAL EXAM
[Obese, well nourished, in no acute distress] : obese, well nourished, in no acute distress [Normal] : affect appropriate [de-identified] : click [de-identified] : obese , soft

## 2021-06-14 NOTE — CONSULT LETTER
[Dear  ___] : Dear  [unfilled], [Consult Letter:] : I had the pleasure of evaluating your patient, [unfilled]. [Please see my note below.] : Please see my note below. [Sincerely,] : Sincerely, [Consult Closing:] : Thank you very much for allowing me to participate in the care of this patient.  If you have any questions, please do not hesitate to contact me. [FreeTextEntry3] : Mary Otero MD

## 2021-06-14 NOTE — HISTORY OF PRESENT ILLNESS
[Improved Health] : Improved health [Quality of Life] : Quality of life [Improved Looks/Aesthetics] : Improved looks/aesthetics [Improve Mood] : Improved mood [Middle Age (45-65)] : middle age (45-65) [Cut/Track Calories] : cut/track calories [Cravings] : cravings [Medical conditions] : medical conditions [Medications] : medications [Other: ____] : [unfilled] [7] : 7 [I usually sleep 4-6 hours] : I usually sleep 4-6 hours [Lunch] : lunch [Dinner] : dinner [Afternoon] : afternoon [Other: ___] : [unfilled] [Less than 1] : Dairy: less than 1 [3-5] : Meat, fish, poultry, eggs, cheese: 3-5 [8] : How many cups of water do you regularly drink per day: 8 [0] : Cups of coffee per day: 0 [Self] : self [Spouse/partner] : spouse/partner [1 block] : Walking distance capability: 1 block [Walking] : walking [3] : 3 [FreeTextEntry2] : 125 [FreeTextEntry3] : 180 [] : No [FreeTextEntry1] : 66 year old woman with a history of multiple medical/cardiac issues here for help with wt loss.\par She has a history of \par 1 MVR( mechanical\par 2 TR repair/replacement \par 3 ? AVR repair replacement \par 4. COPD( former smoker) \par 5 HTN  and pHTN \par 6 renal infarction \par 7 CAD\par \par She is not an overeater, no issues with binging, not a snacker ,but likely has a Hx of medical issues that contributed medical issues as she is also small ( 4'10" ) \par \par Breakfast : tends not to eat \par \par Lunch : 3 pm \par boiled egg with  water \par or fish \par \par snack\par peanuts ( out of the bag)\par halo oranges \par grapes \par \par Dinner( 6 pm ) \par cauliflower and salmon croquets \par \par Sleep \par mn  and awoke at about 8 am \par \par starting to exercise with walking about 30 - 45 min with a goal of daily and a goal  of not having to stop and rest .

## 2021-06-22 ENCOUNTER — APPOINTMENT (OUTPATIENT)
Dept: VASCULAR SURGERY | Facility: CLINIC | Age: 66
End: 2021-06-22

## 2021-06-22 ENCOUNTER — NON-APPOINTMENT (OUTPATIENT)
Age: 66
End: 2021-06-22

## 2021-07-28 ENCOUNTER — APPOINTMENT (OUTPATIENT)
Dept: BARIATRICS/WEIGHT MGMT | Facility: CLINIC | Age: 66
End: 2021-07-28

## 2021-08-04 ENCOUNTER — APPOINTMENT (OUTPATIENT)
Dept: PULMONOLOGY | Facility: CLINIC | Age: 66
End: 2021-08-04
Payer: MEDICARE

## 2021-08-04 VITALS
WEIGHT: 176 LBS | BODY MASS INDEX: 35.55 KG/M2 | SYSTOLIC BLOOD PRESSURE: 114 MMHG | OXYGEN SATURATION: 96 % | DIASTOLIC BLOOD PRESSURE: 62 MMHG | HEART RATE: 97 BPM

## 2021-08-04 DIAGNOSIS — Z87.898 PERSONAL HISTORY OF OTHER SPECIFIED CONDITIONS: ICD-10-CM

## 2021-08-04 DIAGNOSIS — J44.9 CHRONIC OBSTRUCTIVE PULMONARY DISEASE, UNSPECIFIED: ICD-10-CM

## 2021-08-04 DIAGNOSIS — I27.20 PULMONARY HYPERTENSION, UNSPECIFIED: ICD-10-CM

## 2021-08-04 PROCEDURE — 99213 OFFICE O/P EST LOW 20 MIN: CPT

## 2021-09-09 ENCOUNTER — APPOINTMENT (OUTPATIENT)
Dept: OBGYN | Facility: CLINIC | Age: 66
End: 2021-09-09
Payer: MEDICARE

## 2021-09-09 ENCOUNTER — NON-APPOINTMENT (OUTPATIENT)
Age: 66
End: 2021-09-09

## 2021-09-09 VITALS
DIASTOLIC BLOOD PRESSURE: 88 MMHG | SYSTOLIC BLOOD PRESSURE: 130 MMHG | HEIGHT: 59 IN | WEIGHT: 181.44 LBS | BODY MASS INDEX: 36.58 KG/M2

## 2021-09-09 DIAGNOSIS — Z78.0 ASYMPTOMATIC MENOPAUSAL STATE: ICD-10-CM

## 2021-09-09 DIAGNOSIS — Z00.00 ENCOUNTER FOR GENERAL ADULT MEDICAL EXAMINATION W/OUT ABNORMAL FINDINGS: ICD-10-CM

## 2021-09-09 DIAGNOSIS — Z83.3 FAMILY HISTORY OF DIABETES MELLITUS: ICD-10-CM

## 2021-09-09 DIAGNOSIS — Z80.0 FAMILY HISTORY OF MALIGNANT NEOPLASM OF DIGESTIVE ORGANS: ICD-10-CM

## 2021-09-09 DIAGNOSIS — Z82.49 FAMILY HISTORY OF ISCHEMIC HEART DISEASE AND OTHER DISEASES OF THE CIRCULATORY SYSTEM: ICD-10-CM

## 2021-09-09 DIAGNOSIS — Z01.419 ENCOUNTER FOR GYNECOLOGICAL EXAMINATION (GENERAL) (ROUTINE) W/OUT ABNORMAL FINDINGS: ICD-10-CM

## 2021-09-09 PROCEDURE — G0101: CPT

## 2021-09-09 NOTE — HISTORY OF PRESENT ILLNESS
[Y] : Positive pregnancy history [Menarche Age: ____] : age at menarche was [unfilled] [Menopause Age: ____] : age at menopause was [unfilled] [Mammogramdate] : 08/20 [PapSmeardate] : 08/20 [ColonoscopyDate] : 01/17 [PGHxTotal] : 2 [[facility]] : 2 [PGHxPremature] : 0 [PGHxAbortions] : 0 [[facility] Center[facility]] : 1 [PGHxABInduced] : 0 [PGHxEctopic] : 0 [PGHxABSpont] : 0 [PGHxMultBirths] : 0

## 2021-09-10 LAB — HPV HIGH+LOW RISK DNA PNL CVX: NOT DETECTED

## 2021-09-15 LAB — CYTOLOGY CVX/VAG DOC THIN PREP: NORMAL

## 2021-12-06 DIAGNOSIS — Z01.811 ENCOUNTER FOR PREPROCEDURAL RESPIRATORY EXAMINATION: ICD-10-CM

## 2022-01-28 ENCOUNTER — APPOINTMENT (OUTPATIENT)
Dept: PULMONOLOGY | Facility: CLINIC | Age: 67
End: 2022-01-28
Payer: MEDICARE

## 2022-01-28 VITALS — WEIGHT: 185 LBS | BODY MASS INDEX: 37.29 KG/M2 | HEIGHT: 59 IN

## 2022-01-28 VITALS — RESPIRATION RATE: 16 BRPM | HEART RATE: 96 BPM | OXYGEN SATURATION: 89 %

## 2022-01-28 DIAGNOSIS — R09.02 HYPOXEMIA: ICD-10-CM

## 2022-01-28 PROCEDURE — 99214 OFFICE O/P EST MOD 30 MIN: CPT | Mod: 25

## 2022-01-28 PROCEDURE — 94010 BREATHING CAPACITY TEST: CPT

## 2022-01-28 RX ORDER — UMECLIDINIUM BROMIDE AND VILANTEROL TRIFENATATE 62.5; 25 UG/1; UG/1
62.5-25 POWDER RESPIRATORY (INHALATION) DAILY
Qty: 1 | Refills: 5 | Status: DISCONTINUED | COMMUNITY
Start: 2020-03-25 | End: 2022-01-28

## 2022-01-28 NOTE — HISTORY OF PRESENT ILLNESS
[Former] : former [TextBox_4] : 66F PMH severe COPD, HFpEF (G3DD), severe PH, s/p AVR and MVR, CAD, HLD, obesity who presents for f/u visit. She is currently only on albuterol (uses 2-3x times per day); Anoro ellipta was not covered.  She gets SOB after 5 steps. No fevers, no chills. No N/V/D. No LH, dizziness, LOC. No chest pain, no palpitations. No sick contacts. No recent travel. She sees Dr. Roberto from cardiology. \par \par S/p COVID vaccination x2 (2nd dose 05/2021) [TextBox_11] : 0.25 [TextBox_13] : 20 [YearQuit] : 2015

## 2022-01-28 NOTE — PROCEDURE
[FreeTextEntry1] [facility] \[facility] EXAM: XR CHEST PORTABLE IMMED 1V\par \par PROCEDURE DATE: 01/22/2021\par \par \par \par INTERPRETATION: Chest portable.\par \par Clinical History: Shortness of breath.\par \par Comparison: 11/2/2016.\par \par Single AP view submitted.\par \par The evaluation of the cardiomediastinal silhouette is limited on portable technique.\par Sternotomy. Cardiac valve replacement.\par \par Mild prominence of bronchovascular markings at the right lower lung zone, without lobar lung consolidation, pleural effusion or pneumothorax noted.\par \par Surgical clips, right axilla chest wall.\par \par Impression:\par \par Findings as discussed above.\par \par \par \par \par \par \par \par \par MARTY MAYERS MD; Attending Radiologist\par This document has been electronically signed. Jan 22 2021 11:11AM\par \par ~~~~~~~~~~~~~~~~~~~~~~~~~~~~~~~~~~~~~~~~~~~~~~~~~~~~~~~~~~~~~~~~~~~~~~~~\par \par [facility]\par \par Summary:\par  1. Left ventricular ejection fraction, by visual estimation, is 65 to 70%.\par  2. Normal global left ventricular systolic function.\par  3. Basal inferolateral segment is abnormal as described above.\par  4. Spectral Doppler shows restrictive pattern of left ventricular myocardial filling (Grade III diastolic dysfunction).\par  5. Mildly enlarged right ventricle.\par  6. Moderately reduced RV systolic function.\par  7. Trace mitral valve regurgitation.\par  8. Mild-moderate tricuspid regurgitation.\par  9. Known tricuspic repair by history.\par 10. Sclerotic aortic valve with normal opening.\par 11. Estimated pulmonary artery systolic pressure is 101.9 mmHg assuming a right atrial pressure of 3 mmHg, which is consistent with severe pulmonary hypertension.\par \par MD Venkata Electronically signed on 1/22/2021 at 10:28:25 AM\par \par \par \par \par \par *** Final ***\par \par \par \par \par \par JASKARAN ANDRES MD; Attending Cardiologist\par This document has been electronically signed. Jan 22 2021 8:59AM \par \par ~~~~~~~~~~~~~~~~~~~~~~~~~~~~~~~~~~~~~~~~~~~~~~~~~~~~~~~~~~~~~~~~~~~~~~~~\par \par [facility]\par EXAM: CT ANGIO CHEST (W)AW IC \par PROCEDURE DATE: 09/30/2016 \par INTERPRETATION: CLINICAL INFORMATION: Hypoxia, shortness of breath, \par intubated, assess PE. Pulmonary edema/CHF. \par PROCEDURE: CT angiography of the chest was performed with intravenous \par contrast utilizing dedicated PE protocol. Initially, approximately 71 mls \par Omnipaque 350 infiltrated in the left arm. SHONNA Burroughs was made aware by the \par technologist at the time of the incident. The patient returned to CT after \par adequate IV access was obtained. 51 mls of Omnipaque-350 administered \par without complication. Coronal and sagittal reconstruction images were \par obtained. Axial MIP images were obtained from a separate workstation. \par COMPARISON: None. \par FINDINGS: Artifact from the patient's is present motion and arms degrades \par images. \par LUNGS AND AIRWAYS: Patent central airways. Endotracheal tube terminating \par approximately 3.7 cm proximal to the linda. Moderate centrilobular \par emphysema. Dependent opacities in bilateral lower lobes, upper lobes and, to \par a lesser extent, right middle lobe and lingular, which may represent \par atelectasis and/or aspiration/pneumonia. Mild bilateral pulmonary \par groundglass opacities. \par PLEURA: No pneumothorax. Small right greater than the left pleural effusion. \par HEART: Enlarged heart. No pericardial effusion. Aortic valve and mitral \par valve replacement. \par VESSELS: Adequate contrast opacification of the pulmonary arterial trees. No \par pulmonary embolus to the level of the segmental pulmonary arteries. Limited \par evaluation of the subsegmental pulmonary arteries due to motion artifact. \par Atherosclerotic change of the thoracic aorta, great vessels and coronary \par arteries. Bilateral jugular approach central catheter terminating in the SVC. \par CHEST WALL AND LOWER NECK: Extravasated contrast noted in the left arm and \par axillary soft tissue. Mildly heterogeneous thyroid gland. Anasarca. \par Ill-defined stranding in the soft tissue anterior to the sternum. \par MEDIASTINUM AND DARLINE: Subcentimeter mediastinal lymph nodes without \par lymphadenopathy. \par UPPER ABDOMEN: Nonspecific gallbladder wall edema. Intraluminal density in \par the gallbladder, which may represent sludge. Small ascites. Feeding tube \par coursing along the esophagus and stomach without visualization of the tip. \par Bilateral striated nephrogram. \par BONES: Median sternotomy. Degenerative changes of the spine. Age \par indeterminate, mild anterior wedging of the midthoracic spine, likely \par chronic. \par IMPRESSION: Limited evaluation of the subsegmental pulmonary arteries due to \par artifact. No pulmonary embolus to the level of the segmental pulmonary \par arteries, given the extent of artifact. \par Status post endotracheal tube placement. Moderate centrilobular emphysema. \par Dependent opacities in both lungs, which haven't present atelectasis and/or \par aspiration/pneumonia. Mild bilateral pulmonary groundglass opacities, which \par may be related to pulmonary edema or pneumonia. Recommend clinical \par correlation and follow-up. \par Ill-defined stranding in the soft tissue anterior to the sternum, which may \par represent edema and/or cellulitis. Recommend clinical question. \par Mildly heterogeneous thyroid gland, which can be further assessed on a \par follow-up ultrasound is clinically indicated. \par Nonspecific gallbladder wall edema, which can be seen in CHF or \par cholecystitis. Recommend clinical collection and additional imaging as \par clinically indicated. \par Bilateral striated nephrogram. Recommend cortical correlation with \par urinalysis to assess for urinary tract infection/pyelonephritis. \par Small ascites. \par Discussed above findings and contrast extravasation with SHONNA Burroughs. \par CRISTINA VALENZUELA M.D., ATTENDING RADIOLOGIST \par This document has been electronically signed. Sep 30 2016 7:04AM \par \par ~~~~~~~~~~~~~~~~~~~~~~~~~~~~~~~~~~~~~~~~~~~~~~~~~~~~~~~~~~~~~~~~~~~~~~~~\par \par [facility]\par 	\par Exam requested by:\par MIGRATED REFERRING MD\par [address]\par [facility] [id number]\par SITE PERFORMED: [facility]\par SITE PHONE: [phone] \par Patient: NANDA ROCA\par YOB: 1955\par Phone: [phone]\par MRN: [id number] Acc: [id number]\par Date of Exam: 08-\par  \par \par EXAM:  CT chest with IV contrast\par \par CLINICAL HISTORY:  Dyspnea\par \par TECHNIQUE:\par Multidetector CT of the chest was performed using contiguous axial images, following the injection of 80 cc of Isovue-300.  \par \par Medical [facility] Radiology uses various dose-reduction strategies, including automatic exposure control, adjustment of mA and/or kV according to patient size, and iterative reconstruction techniques.\par \par COMPARISON:\par None. \par \par FINDINGS:\par Tracheobronchial tree: Patent where visualized. \par \par Mediastinum and Darline: No dominant adenopathy or fluid collection. \par \par Pulmonary parenchyma: No consolidation or dominant measurable mass. No architectural distortion. \par \par Pleura: No effusion or pneumothorax. \par \par Aorta: Thoracic portion non-dilated. There is a prosthetic aortic and mitral valve, no pericardial fluid or pulmonary edema.  Overall heart size is within normal limits. Midline sternal wires are present \par \par Upper abdomen:  Unremarkable. \par \par IMPRESSION:\par No evidence of infiltrate, pulmonary mass or lymphadenopathy. \par \par Status post AVR, MVR, including midline sternal wires, with no evidence of pericardial fluid or pulmonary edema. \par Thank you for the opportunity to participate in the care of this patient.  \par  \par EMIR LANGSTON  - Electronically Signed: 08- 2:02 PM

## 2022-04-08 ENCOUNTER — APPOINTMENT (OUTPATIENT)
Dept: PULMONOLOGY | Facility: CLINIC | Age: 67
End: 2022-04-08
Payer: MEDICARE

## 2022-04-08 VITALS — SYSTOLIC BLOOD PRESSURE: 124 MMHG | DIASTOLIC BLOOD PRESSURE: 80 MMHG | BODY MASS INDEX: 36.56 KG/M2 | WEIGHT: 181 LBS

## 2022-04-08 VITALS — OXYGEN SATURATION: 96 % | HEART RATE: 104 BPM

## 2022-04-08 PROCEDURE — 99213 OFFICE O/P EST LOW 20 MIN: CPT

## 2022-04-08 RX ORDER — ALBUTEROL SULFATE 90 UG/1
108 (90 BASE) INHALANT RESPIRATORY (INHALATION)
Qty: 1 | Refills: 5 | Status: ACTIVE | COMMUNITY
Start: 2021-08-04 | End: 1900-01-01

## 2022-04-08 NOTE — PROCEDURE
[facility]Entry1[facility] \[facility]: XR CHEST PORTABLE IMMED 1V\par \par PROCEDURE DATE: 01/22/2021\par \par \par \par INTERPRETATION: Chest portable.\par \par Clinical History: Shortness of breath.\par \par Comparison: 11/2/2016.\par \par Single AP view submitted.\par \par The evaluation of the cardiomediastinal silhouette is limited on portable technique.\par Sternotomy. Cardiac valve replacement.\par \par Mild prominence of bronchovascular markings at the right lower lung zone, without lobar lung consolidation, pleural effusion or pneumothorax noted.\par \par Surgical clips, right axilla chest wall.\par \par Impression:\par \par Findings as discussed above.\par \par \par \par \par \par \par \par \par MARTY MAYERS MD; Attending Radiologist\par This document has been electronically signed. Jan 22 2021 11:11AM\par \par ~~~~~~~~~~~~~~~~~~~~~~~~~~~~~~~~~~~~~~~~~~~~~~~~~~~~~~~~~~~~~~~~~~~~~~~~\par \par [facility]\par \par Summary:\par  1. Left ventricular ejection fraction, by visual estimation, is 65 to 70%.\par  2. Normal global left ventricular systolic function.\par  3. Basal inferolateral segment is abnormal as described above.\par  4. Spectral Doppler shows restrictive pattern of left ventricular myocardial filling (Grade III diastolic dysfunction).\par  5. Mildly enlarged right ventricle.\par  6. Moderately reduced RV systolic function.\par  7. Trace mitral valve regurgitation.\par  8. Mild-moderate tricuspid regurgitation.\par  9. Known tricuspic repair by history.\par 10. Sclerotic aortic valve with normal opening.\par 11. Estimated pulmonary artery systolic pressure is 101.9 mmHg assuming a right atrial pressure of 3 mmHg, which is consistent with severe pulmonary hypertension.\par \par MD Venkata Electronically signed on 1/22/2021 at 10:28:25 AM\par \par \par \par \par \par *** Final ***\par \par \par \par \par \par JASKARAN ANDRES MD; Attending Cardiologist\par This document has been electronically signed. Jan 22 2021 8:59AM \par \par ~~~~~~~~~~~~~~~~~~~~~~~~~~~~~~~~~~~~~~~~~~~~~~~~~~~~~~~~~~~~~~~~~~~~~~~~\par \par [facility]\par EXAM: CT ANGIO CHEST (W)AW IC \par PROCEDURE DATE: 09/30/2016 \par INTERPRETATION: CLINICAL INFORMATION: Hypoxia, shortness of breath, \par intubated, assess PE. Pulmonary edema/CHF. \par PROCEDURE: CT angiography of the chest was performed with intravenous \par contrast utilizing dedicated PE protocol. Initially, approximately 71 mls \par Omnipaque 350 infiltrated in the left arm. SHONNA Burroughs was made aware by the \par technologist at the time of the incident. The patient returned to CT after \par adequate IV access was obtained. 51 mls of Omnipaque-350 administered \par without complication. Coronal and sagittal reconstruction images were \par obtained. Axial MIP images were obtained from a separate workstation. \par COMPARISON: None. \par FINDINGS: Artifact from the patient's is present motion and arms degrades \par images. \par LUNGS AND AIRWAYS: Patent central airways. Endotracheal tube terminating \par approximately 3.7 cm proximal to the linda. Moderate centrilobular \par emphysema. Dependent opacities in bilateral lower lobes, upper lobes and, to \par a lesser extent, right middle lobe and lingular, which may represent \par atelectasis and/or aspiration/pneumonia. Mild bilateral pulmonary \par groundglass opacities. \par PLEURA: No pneumothorax. Small right greater than the left pleural effusion. \par HEART: Enlarged heart. No pericardial effusion. Aortic valve and mitral \par valve replacement. \par VESSELS: Adequate contrast opacification of the pulmonary arterial trees. No \par pulmonary embolus to the level of the segmental pulmonary arteries. Limited \par evaluation of the subsegmental pulmonary arteries due to motion artifact. \par Atherosclerotic change of the thoracic aorta, great vessels and coronary \par arteries. Bilateral jugular approach central catheter terminating in the SVC. \par CHEST WALL AND LOWER NECK: Extravasated contrast noted in the left arm and \par axillary soft tissue. Mildly heterogeneous thyroid gland. Anasarca. \par Ill-defined stranding in the soft tissue anterior to the sternum. \par MEDIASTINUM AND DARLINE: Subcentimeter mediastinal lymph nodes without \par lymphadenopathy. \par UPPER ABDOMEN: Nonspecific gallbladder wall edema. Intraluminal density in \par the gallbladder, which may represent sludge. Small ascites. Feeding tube \par coursing along the esophagus and stomach without visualization of the tip. \par Bilateral striated nephrogram. \par BONES: Median sternotomy. Degenerative changes of the spine. Age \par indeterminate, mild anterior wedging of the midthoracic spine, likely \par chronic. \par IMPRESSION: Limited evaluation of the subsegmental pulmonary arteries due to \par artifact. No pulmonary embolus to the level of the segmental pulmonary \par arteries, given the extent of artifact. \par Status post endotracheal tube placement. Moderate centrilobular emphysema. \par Dependent opacities in both lungs, which haven't present atelectasis and/or \par aspiration/pneumonia. Mild bilateral pulmonary groundglass opacities, which \par may be related to pulmonary edema or pneumonia. Recommend clinical \par correlation and follow-up. \par Ill-defined stranding in the soft tissue anterior to the sternum, which may \par represent edema and/or cellulitis. Recommend clinical question. \par Mildly heterogeneous thyroid gland, which can be further assessed on a \par follow-up ultrasound is clinically indicated. \par Nonspecific gallbladder wall edema, which can be seen in CHF or \par cholecystitis. Recommend clinical collection and additional imaging as \par clinically indicated. \par Bilateral striated nephrogram. Recommend cortical correlation with \par urinalysis to assess for urinary tract infection/pyelonephritis. \par Small ascites. \par Discussed above findings and contrast extravasation with SHONNA Burroughs. \par CRISTINA VALENZUELA M.D., ATTENDING RADIOLOGIST \par This document has been electronically signed. Sep 30 2016 7:04AM \par \par ~~~~~~~~~~~~~~~~~~~~~~~~~~~~~~~~~~~~~~~~~~~~~~~~~~~~~~~~~~~~~~~~~~~~~~~~\par \par [facility]\par 	\par Exam requested by:\par MIGRATED REFERRING MD\par [address]\par [facility] [id number]\par SITE PERFORMED: [facility]\par SITE PHONE: [phone] \par Patient: NANDA ROCA\par YOB: 1955\par Phone: [phone]\par MRN: [id number] Acc: [id number]\par Date of Exam: 08-\par  \par \par EXAM: CT chest with IV contrast\par \par CLINICAL HISTORY: Dyspnea\par \par TECHNIQUE:\par Multidetector CT of the chest was performed using contiguous axial images, following the injection of 80 cc of Isovue-300. \par \par Medical [facility] Radiology uses various dose-reduction strategies, including automatic exposure control, adjustment of mA and/or kV according to patient size, and iterative reconstruction techniques.\par \par COMPARISON:\par None. \par \par FINDINGS:\par Tracheobronchial tree: Patent where visualized. \par \par Mediastinum and Darline: No dominant adenopathy or fluid collection. \par \par Pulmonary parenchyma: No consolidation or dominant measurable mass. No architectural distortion. \par \par Pleura: No effusion or pneumothorax. \par \par Aorta: Thoracic portion non-dilated. There is a prosthetic aortic and mitral valve, no pericardial fluid or pulmonary edema. Overall heart size is within normal limits. Midline sternal wires are present \par \par Upper abdomen: Unremarkable. \par \par IMPRESSION:\par No evidence of infiltrate, pulmonary mass or lymphadenopathy. \par \par Status post AVR, MVR, including midline sternal wires, with no evidence of pericardial fluid or pulmonary edema. \par Thank you for the opportunity to participate in the care of this patient. \par  \par EMIR LANGSTON - Electronically Signed: 08- 2:02 PM \par

## 2022-04-08 NOTE — HISTORY OF PRESENT ILLNESS
[Former] : former [TextBox_4] : 66F PMH severe COPD, HFpEF (G3DD), severe PH, s/p AVR and MVR, CAD, HLD, obesity who presents for f/u visit. Last visit was started on Trelegy ellipta and given referral to pulmonary rehab. She was also started on home oxygen. She uses it at night and PRN. No recent hospitalizations. No increased cough.  No increased SOB. No N/V/D.  [TextBox_11] : 0.25 [TextBox_13] : 20 [YearQuit] : 2015

## 2022-07-01 RX ORDER — FLUTICASONE FUROATE, UMECLIDINIUM BROMIDE AND VILANTEROL TRIFENATATE 100; 62.5; 25 UG/1; UG/1; UG/1
100-62.5-25 POWDER RESPIRATORY (INHALATION) DAILY
Qty: 1 | Refills: 5 | Status: COMPLETED | COMMUNITY
Start: 2022-01-28 | End: 2022-07-01

## 2022-07-05 ENCOUNTER — OUTPATIENT (OUTPATIENT)
Dept: OUTPATIENT SERVICES | Facility: HOSPITAL | Age: 67
LOS: 1 days | End: 2022-07-05
Payer: MEDICARE

## 2022-07-05 DIAGNOSIS — Z95.2 PRESENCE OF PROSTHETIC HEART VALVE: Chronic | ICD-10-CM

## 2022-07-05 DIAGNOSIS — Z78.9 OTHER SPECIFIED HEALTH STATUS: Chronic | ICD-10-CM

## 2022-07-05 DIAGNOSIS — J44.9 CHRONIC OBSTRUCTIVE PULMONARY DISEASE, UNSPECIFIED: ICD-10-CM

## 2022-07-14 ENCOUNTER — APPOINTMENT (OUTPATIENT)
Dept: PULMONOLOGY | Facility: CLINIC | Age: 67
End: 2022-07-14

## 2022-07-14 VITALS
OXYGEN SATURATION: 90 % | BODY MASS INDEX: 35.08 KG/M2 | WEIGHT: 174 LBS | HEIGHT: 59 IN | HEART RATE: 102 BPM | DIASTOLIC BLOOD PRESSURE: 72 MMHG | SYSTOLIC BLOOD PRESSURE: 128 MMHG | RESPIRATION RATE: 16 BRPM

## 2022-07-14 DIAGNOSIS — R05.3 CHRONIC COUGH: ICD-10-CM

## 2022-07-14 PROCEDURE — 99214 OFFICE O/P EST MOD 30 MIN: CPT

## 2022-07-14 RX ORDER — FLUTICASONE FUROATE, UMECLIDINIUM BROMIDE AND VILANTEROL TRIFENATATE 100; 62.5; 25 UG/1; UG/1; UG/1
100-62.5-25 POWDER RESPIRATORY (INHALATION) DAILY
Qty: 3 | Refills: 2 | Status: ACTIVE | COMMUNITY
Start: 2022-06-29 | End: 1900-01-01

## 2022-07-14 NOTE — HISTORY OF PRESENT ILLNESS
[Former] : former [TextBox_4] : 67F PMH severe COPD, HFpEF (G3DD), severe PH, s/p AVR and MVR, CAD, HLD, obesity who presents for f/u visit. She is on Trelegy ellipta. Had CXR done 6/27/22 for cough, showing no acute findings. Started pulmonary rehab few weeks ago. No hospitalizations. No fevers, no chills. No N/V/D. No LOC. She has a dry cough for 3 months, is on Tessalon perles which help. She is using albuterol only occasionally, not every day. She is getting lung CA screening - with Tiara Quiles NP ([facility]).  [TextBox_11] : 0.25 [TextBox_13] : 20 [YearQuit] : 2015

## 2022-07-15 RX ORDER — BENZONATATE 150 MG/1
CAPSULE ORAL
Refills: 0 | Status: DISCONTINUED | COMMUNITY

## 2022-08-01 RX ORDER — BENZONATATE 200 MG/1
200 CAPSULE ORAL
Qty: 270 | Refills: 3 | Status: ACTIVE | COMMUNITY
Start: 2022-07-14 | End: 1900-01-01

## 2022-09-08 PROCEDURE — 94625 PHY/QHP OP PULM RHB W/O MNTR: CPT

## 2022-09-12 ENCOUNTER — APPOINTMENT (OUTPATIENT)
Dept: OBGYN | Facility: CLINIC | Age: 67
End: 2022-09-12

## 2022-09-12 VITALS
BODY MASS INDEX: 35.28 KG/M2 | DIASTOLIC BLOOD PRESSURE: 80 MMHG | WEIGHT: 175 LBS | SYSTOLIC BLOOD PRESSURE: 130 MMHG | HEIGHT: 59 IN

## 2022-09-12 DIAGNOSIS — Z01.411 ENCOUNTER FOR GYNECOLOGICAL EXAMINATION (GENERAL) (ROUTINE) WITH ABNORMAL FINDINGS: ICD-10-CM

## 2022-09-12 DIAGNOSIS — R92.1 MAMMOGRAPHIC CALCIFICATION FOUND ON DIAGNOSTIC IMAGING OF BREAST: ICD-10-CM

## 2022-09-12 PROCEDURE — G0101: CPT

## 2022-09-12 NOTE — HISTORY OF PRESENT ILLNESS
[Y] : Positive pregnancy history [Menarche Age: ____] : age at menarche was [unfilled] [Menopause Age: ____] : age at menopause was [unfilled] [FreeTextEntry1] : 67 year old female presents for annual examination. Doing well no GYN complaints. Pt. states she had mammogram performed that came back abnormal and needs breast biopsy performed.  [PGHxTotal] : 2 [[facility] [PGHxPremature] : 0 [PGHxAbortions] : 0 [[facility] : [facility] [PGHxABInduced] : 0 [PGHxABSpont] : 0 [PGHxEctopic] : 0 [PGHxMultBirths] : 0

## 2022-09-12 NOTE — PLAN
[FreeTextEntry1] : Encounter for GYN exam \par \par - PAP obtained \par - Mammogram prescription for 2023 provided. Breast biopsy prescription given after review of mammogram/breast us from august 2022. \par \par F/U in 1 year or PRN \par All questions and concerns addressed during encounter. Pt. agreed to plan of care.

## 2022-09-13 LAB — HPV HIGH+LOW RISK DNA PNL CVX: NOT DETECTED

## 2022-09-19 LAB — CYTOLOGY CVX/VAG DOC THIN PREP: NORMAL

## 2022-11-16 ENCOUNTER — APPOINTMENT (OUTPATIENT)
Dept: PULMONOLOGY | Facility: CLINIC | Age: 67
End: 2022-11-16
